# Patient Record
Sex: FEMALE | Race: WHITE | NOT HISPANIC OR LATINO | Employment: UNEMPLOYED | URBAN - METROPOLITAN AREA
[De-identification: names, ages, dates, MRNs, and addresses within clinical notes are randomized per-mention and may not be internally consistent; named-entity substitution may affect disease eponyms.]

---

## 2017-11-06 ENCOUNTER — HOSPITAL ENCOUNTER (EMERGENCY)
Facility: MEDICAL CENTER | Age: 57
End: 2017-11-06

## 2017-11-06 VITALS
BODY MASS INDEX: 28.16 KG/M2 | OXYGEN SATURATION: 98 % | SYSTOLIC BLOOD PRESSURE: 160 MMHG | DIASTOLIC BLOOD PRESSURE: 90 MMHG | HEIGHT: 62 IN | WEIGHT: 153 LBS | HEART RATE: 74 BPM | TEMPERATURE: 97.8 F | RESPIRATION RATE: 18 BRPM

## 2017-11-06 PROCEDURE — 302449 STATCHG TRIAGE ONLY (STATISTIC)

## 2017-11-06 ASSESSMENT — PAIN SCALES - GENERAL: PAINLEVEL_OUTOF10: 8
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
## 2017-12-16 ENCOUNTER — HOSPITAL ENCOUNTER (EMERGENCY)
Facility: MEDICAL CENTER | Age: 57
End: 2017-12-16
Attending: EMERGENCY MEDICINE
Payer: MEDICAID

## 2017-12-16 VITALS
RESPIRATION RATE: 18 BRPM | BODY MASS INDEX: 28.52 KG/M2 | DIASTOLIC BLOOD PRESSURE: 80 MMHG | HEIGHT: 62 IN | TEMPERATURE: 97.7 F | SYSTOLIC BLOOD PRESSURE: 124 MMHG | WEIGHT: 155 LBS | HEART RATE: 76 BPM | OXYGEN SATURATION: 99 %

## 2017-12-16 DIAGNOSIS — T23.239A: ICD-10-CM

## 2017-12-16 DIAGNOSIS — T23.209A: ICD-10-CM

## 2017-12-16 PROCEDURE — 99284 EMERGENCY DEPT VISIT MOD MDM: CPT

## 2017-12-16 PROCEDURE — A9270 NON-COVERED ITEM OR SERVICE: HCPCS | Performed by: EMERGENCY MEDICINE

## 2017-12-16 PROCEDURE — 700102 HCHG RX REV CODE 250 W/ 637 OVERRIDE(OP): Performed by: EMERGENCY MEDICINE

## 2017-12-16 RX ORDER — OXYCODONE HYDROCHLORIDE AND ACETAMINOPHEN 5; 325 MG/1; MG/1
1-2 TABLET ORAL EVERY 4 HOURS PRN
Qty: 15 TAB | Refills: 0 | Status: SHIPPED | OUTPATIENT
Start: 2017-12-16 | End: 2017-12-19

## 2017-12-16 RX ADMIN — SILVER SULFADIAZINE 1 G: 10 CREAM TOPICAL at 04:11

## 2017-12-16 NOTE — DISCHARGE INSTRUCTIONS
Burn Care  Your skin is a natural barrier to infection. It is the largest organ of your body. Burns damage this natural protection. To help prevent infection, it is very important to follow your caregiver's instructions in the care of your burn.  Burns are classified as:  · First degree. There is only redness of the skin (erythema). No scarring is expected.  · Second degree. There is blistering of the skin. Scarring may occur with deeper burns.  · Third degree. All layers of the skin are injured, and scarring is expected.  HOME CARE INSTRUCTIONS   · Wash your hands well before changing your bandage.  · Change your bandage as often as directed by your caregiver.  ¨ Remove the old bandage. If the bandage sticks, you may soak it off with cool, clean water.  ¨ Cleanse the burn thoroughly but gently with mild soap and water.  ¨ Pat the area dry with a clean, dry cloth.  ¨ Apply a thin layer of antibacterial cream to the burn.  ¨ Apply a clean bandage as instructed by your caregiver.  ¨ Keep the bandage as clean and dry as possible.  · Elevate the affected area for the first 24 hours, then as instructed by your caregiver.  · Only take over-the-counter or prescription medicines for pain, discomfort, or fever as directed by your caregiver.  SEEK IMMEDIATE MEDICAL CARE IF:   · You develop excessive pain.  · You develop redness, tenderness, swelling, or red streaks near the burn.  · The burned area develops yellowish-white fluid (pus) or a bad smell.  · You have a fever.  MAKE SURE YOU:   · Understand these instructions.  · Will watch your condition.  · Will get help right away if you are not doing well or get worse.     This information is not intended to replace advice given to you by your health care provider. Make sure you discuss any questions you have with your health care provider.     Document Released: 12/18/2006 Document Revised: 03/11/2013 Document Reviewed: 05/09/2012  [employer] Interactive Patient Education ©2016  [employer]
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
## 2017-12-16 NOTE — ED NOTES
D/C home with written and verbal instructions re: Rx, activity, f/u.  Verbalizes understanding.  Ambulated out

## 2018-09-28 ENCOUNTER — APPOINTMENT (OUTPATIENT)
Dept: RADIOLOGY | Facility: MEDICAL CENTER | Age: 58
End: 2018-09-28
Attending: EMERGENCY MEDICINE
Payer: MEDICAID

## 2018-09-28 ENCOUNTER — HOSPITAL ENCOUNTER (EMERGENCY)
Facility: MEDICAL CENTER | Age: 58
End: 2018-09-28
Attending: EMERGENCY MEDICINE
Payer: MEDICAID

## 2018-09-28 VITALS
WEIGHT: 152.78 LBS | BODY MASS INDEX: 28.11 KG/M2 | HEIGHT: 62 IN | SYSTOLIC BLOOD PRESSURE: 147 MMHG | OXYGEN SATURATION: 98 % | DIASTOLIC BLOOD PRESSURE: 91 MMHG | TEMPERATURE: 98.2 F | HEART RATE: 75 BPM | RESPIRATION RATE: 16 BRPM

## 2018-09-28 DIAGNOSIS — S63.501A WRIST SPRAIN, RIGHT, INITIAL ENCOUNTER: ICD-10-CM

## 2018-09-28 DIAGNOSIS — S63.91XA HAND SPRAIN, RIGHT, INITIAL ENCOUNTER: ICD-10-CM

## 2018-09-28 PROCEDURE — 73130 X-RAY EXAM OF HAND: CPT | Mod: RT

## 2018-09-28 PROCEDURE — 99284 EMERGENCY DEPT VISIT MOD MDM: CPT

## 2018-09-28 PROCEDURE — 29125 APPL SHORT ARM SPLINT STATIC: CPT

## 2018-09-28 ASSESSMENT — ENCOUNTER SYMPTOMS
FALLS: 1
HEADACHES: 0

## 2018-09-28 ASSESSMENT — PAIN SCALES - GENERAL: PAINLEVEL_OUTOF10: 7
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
## 2018-09-28 NOTE — ED NOTES
Pt discharged home. Assessment complete. Pt ambulates self. VS stable. Pt verbalized understanding discharge instructions. Splint applied.
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
## 2018-09-28 NOTE — ED NOTES
Assumed care of patient. Pt assessed, AAO x 4 . Patient's concerns addressed.  Fall precautions in place.  Pt repositioned and comfortable.  Call light within reach. Will continue to monitor.

## 2018-09-28 NOTE — ED NOTES
KINDER FALL RISK       RISK  Present to ED b/c of fall (syncope, seizure, or ALOC) yes  Age  >70 no  Altered Mental Status (Intoxicated with alcohol or substance confusion, inability to follow instructions, disorientation) no  Impaired Mobility (Ambulates or transfers with assistive devices or assist. Ambulates with unsteady gait and no assistance.  Unable to ambulate to transfer.) no  Nursing Judgement (Bowel or bladder incontinence, diarrhea, urinary frequency or urgency, leg weakness, orthostatic hypotension, dizziness or vertigo, narcotic use.) no    YES TO ANY RISK = HIGH FALL RISK     Interventions in place marked in RED   1. Move patient closer to nurses stations  2. Familiarize the patient with environment  3. Place call light within reach and demonstrate call light use  4. Keep patients personal possessions within patient safe reach (if appropriate)   5. Place stretcher in low position and brakes locked  6.Place yellow socks and armband on patient   7. Place green triangle on patients door  8. Give patient urinal if applicable  9. Keep floor surfaces clean and dry  10.Keep patient care areas uncluttered  11. Use a lap belt or posey vest   12. Assess patient hourly for :Pain, persona needs, position change, and call light access.

## 2021-10-16 ENCOUNTER — APPOINTMENT (OUTPATIENT)
Dept: RADIOLOGY | Facility: MEDICAL CENTER | Age: 61
End: 2021-10-16
Attending: EMERGENCY MEDICINE
Payer: MEDICAID

## 2021-10-16 ENCOUNTER — HOSPITAL ENCOUNTER (EMERGENCY)
Facility: MEDICAL CENTER | Age: 61
End: 2021-10-16
Attending: EMERGENCY MEDICINE
Payer: MEDICAID

## 2021-10-16 VITALS
TEMPERATURE: 98.1 F | HEART RATE: 68 BPM | HEIGHT: 62 IN | SYSTOLIC BLOOD PRESSURE: 184 MMHG | WEIGHT: 155 LBS | OXYGEN SATURATION: 93 % | DIASTOLIC BLOOD PRESSURE: 105 MMHG | RESPIRATION RATE: 27 BRPM | BODY MASS INDEX: 28.52 KG/M2

## 2021-10-16 DIAGNOSIS — I10 HTN (HYPERTENSION): ICD-10-CM

## 2021-10-16 DIAGNOSIS — I15.9 SECONDARY HYPERTENSION: ICD-10-CM

## 2021-10-16 DIAGNOSIS — J40 BRONCHITIS: ICD-10-CM

## 2021-10-16 DIAGNOSIS — B34.9 ACUTE VIRAL SYNDROME: ICD-10-CM

## 2021-10-16 LAB
ALBUMIN SERPL BCP-MCNC: 4.1 G/DL (ref 3.2–4.9)
ALBUMIN/GLOB SERPL: 1.1 G/DL
ALP SERPL-CCNC: 78 U/L (ref 30–99)
ALT SERPL-CCNC: 14 U/L (ref 2–50)
ANION GAP SERPL CALC-SCNC: 13 MMOL/L (ref 7–16)
AST SERPL-CCNC: 23 U/L (ref 12–45)
BASOPHILS # BLD AUTO: 0.6 % (ref 0–1.8)
BASOPHILS # BLD: 0.03 K/UL (ref 0–0.12)
BILIRUB SERPL-MCNC: 0.5 MG/DL (ref 0.1–1.5)
BUN SERPL-MCNC: 11 MG/DL (ref 8–22)
CALCIUM SERPL-MCNC: 9.1 MG/DL (ref 8.5–10.5)
CHLORIDE SERPL-SCNC: 105 MMOL/L (ref 96–112)
CO2 SERPL-SCNC: 21 MMOL/L (ref 20–33)
CREAT SERPL-MCNC: 0.57 MG/DL (ref 0.5–1.4)
EOSINOPHIL # BLD AUTO: 0.09 K/UL (ref 0–0.51)
EOSINOPHIL NFR BLD: 1.8 % (ref 0–6.9)
ERYTHROCYTE [DISTWIDTH] IN BLOOD BY AUTOMATED COUNT: 44.6 FL (ref 35.9–50)
GLOBULIN SER CALC-MCNC: 3.6 G/DL (ref 1.9–3.5)
GLUCOSE SERPL-MCNC: 101 MG/DL (ref 65–99)
HCT VFR BLD AUTO: 39.5 % (ref 37–47)
HGB BLD-MCNC: 14 G/DL (ref 12–16)
IMM GRANULOCYTES # BLD AUTO: 0.02 K/UL (ref 0–0.11)
IMM GRANULOCYTES NFR BLD AUTO: 0.4 % (ref 0–0.9)
LYMPHOCYTES # BLD AUTO: 1.44 K/UL (ref 1–4.8)
LYMPHOCYTES NFR BLD: 28.3 % (ref 22–41)
MCH RBC QN AUTO: 33.2 PG (ref 27–33)
MCHC RBC AUTO-ENTMCNC: 35.4 G/DL (ref 33.6–35)
MCV RBC AUTO: 93.6 FL (ref 81.4–97.8)
MONOCYTES # BLD AUTO: 0.55 K/UL (ref 0–0.85)
MONOCYTES NFR BLD AUTO: 10.8 % (ref 0–13.4)
NEUTROPHILS # BLD AUTO: 2.96 K/UL (ref 2–7.15)
NEUTROPHILS NFR BLD: 58.1 % (ref 44–72)
NRBC # BLD AUTO: 0 K/UL
NRBC BLD-RTO: 0 /100 WBC
PLATELET # BLD AUTO: 163 K/UL (ref 164–446)
PMV BLD AUTO: 11.1 FL (ref 9–12.9)
POTASSIUM SERPL-SCNC: 3.7 MMOL/L (ref 3.6–5.5)
PROT SERPL-MCNC: 7.7 G/DL (ref 6–8.2)
RBC # BLD AUTO: 4.22 M/UL (ref 4.2–5.4)
SODIUM SERPL-SCNC: 139 MMOL/L (ref 135–145)
TROPONIN T SERPL-MCNC: <6 NG/L (ref 6–19)
WBC # BLD AUTO: 5.1 K/UL (ref 4.8–10.8)

## 2021-10-16 PROCEDURE — 700102 HCHG RX REV CODE 250 W/ 637 OVERRIDE(OP): Performed by: EMERGENCY MEDICINE

## 2021-10-16 PROCEDURE — 700111 HCHG RX REV CODE 636 W/ 250 OVERRIDE (IP): Performed by: EMERGENCY MEDICINE

## 2021-10-16 PROCEDURE — 85025 COMPLETE CBC W/AUTO DIFF WBC: CPT

## 2021-10-16 PROCEDURE — A9270 NON-COVERED ITEM OR SERVICE: HCPCS | Performed by: EMERGENCY MEDICINE

## 2021-10-16 PROCEDURE — 80053 COMPREHEN METABOLIC PANEL: CPT

## 2021-10-16 PROCEDURE — 96374 THER/PROPH/DIAG INJ IV PUSH: CPT

## 2021-10-16 PROCEDURE — 99284 EMERGENCY DEPT VISIT MOD MDM: CPT

## 2021-10-16 PROCEDURE — 71045 X-RAY EXAM CHEST 1 VIEW: CPT

## 2021-10-16 PROCEDURE — U0005 INFEC AGEN DETEC AMPLI PROBE: HCPCS

## 2021-10-16 PROCEDURE — 84484 ASSAY OF TROPONIN QUANT: CPT

## 2021-10-16 PROCEDURE — U0003 INFECTIOUS AGENT DETECTION BY NUCLEIC ACID (DNA OR RNA); SEVERE ACUTE RESPIRATORY SYNDROME CORONAVIRUS 2 (SARS-COV-2) (CORONAVIRUS DISEASE [COVID-19]), AMPLIFIED PROBE TECHNIQUE, MAKING USE OF HIGH THROUGHPUT TECHNOLOGIES AS DESCRIBED BY CMS-2020-01-R: HCPCS

## 2021-10-16 RX ORDER — LISINOPRIL 20 MG/1
20 TABLET ORAL DAILY
Qty: 30 TABLET | Refills: 4 | Status: SHIPPED | OUTPATIENT
Start: 2021-10-16

## 2021-10-16 RX ORDER — BENZONATATE 100 MG/1
200 CAPSULE ORAL 3 TIMES DAILY PRN
Qty: 20 CAPSULE | Refills: 0 | Status: SHIPPED | OUTPATIENT
Start: 2021-10-16

## 2021-10-16 RX ORDER — LISINOPRIL 20 MG/1
20 TABLET ORAL ONCE
Status: COMPLETED | OUTPATIENT
Start: 2021-10-16 | End: 2021-10-16

## 2021-10-16 RX ORDER — DEXAMETHASONE SODIUM PHOSPHATE 4 MG/ML
4 INJECTION, SOLUTION INTRA-ARTICULAR; INTRALESIONAL; INTRAMUSCULAR; INTRAVENOUS; SOFT TISSUE ONCE
Status: COMPLETED | OUTPATIENT
Start: 2021-10-16 | End: 2021-10-16

## 2021-10-16 RX ADMIN — LISINOPRIL 20 MG: 20 TABLET ORAL at 17:40

## 2021-10-16 RX ADMIN — DEXAMETHASONE SODIUM PHOSPHATE 4 MG: 4 INJECTION, SOLUTION INTRA-ARTICULAR; INTRALESIONAL; INTRAMUSCULAR; INTRAVENOUS; SOFT TISSUE at 17:40

## 2021-10-16 NOTE — ED TRIAGE NOTES
"Pt amb to triage.  Chief Complaint   Patient presents with   • Headache   • Cough     Pt reports symptoms x1wk. Smoker.  Pt hypertensive in triage, reports she was prescribed meds awhile ago but doesn't take anything. \"I haven't seen my doctor.\"  "

## 2021-10-17 LAB
SARS-COV-2 RNA RESP QL NAA+PROBE: NOTDETECTED
SPECIMEN SOURCE: NORMAL

## 2021-10-17 NOTE — ED PROVIDER NOTES
ED Provider Note    CHIEF COMPLAINT  Chief Complaint   Patient presents with   • Headache   • Cough   • Hypertension       HPI  Meggan Castro is a 61 y.o. female who presents for evaluation of a cough and headache for the past week.  A frequent dry cough.  She denies fever.  She states she does have some body aches associated with headache.  The patient reports she is a smoker but denies any diagnosis of COPD.  She states that she frequently gets bronchitis.  She currently is sleeping in her car at nighttime and thinks that the cold weather could have something to do with this.  No vomiting.  No abdominal pain.  No real chest or back pain.  She has received 1 dose of the Covid vaccine.  No other acute complaints offered at this time.  She states that she has not been on her blood pressure medication in years.    REVIEW OF SYSTEMS  Negative for fever, rash, abdominal pain, nausea, vomiting, diarrhea, focal weakness, focal numbness, focal tingling, back pain. All other systems are negative.     PAST MEDICAL HISTORY  Past Medical History:   Diagnosis Date   • HTN (hypertension)    • Psychiatric problem        FAMILY HISTORY  Family History   Problem Relation Age of Onset   • Cancer Mother    • Heart Disease Father    • Stroke Father    • Diabetes Maternal Grandmother        SOCIAL HISTORY  Social History     Tobacco Use   • Smoking status: Current Every Day Smoker     Packs/day: 0.25     Years: 10.00     Pack years: 2.50     Types: Cigarettes   • Smokeless tobacco: Never Used   Substance Use Topics   • Alcohol use: Yes   • Drug use: Yes     Types: Inhaled     Comment: METH       SURGICAL HISTORY  Past Surgical History:   Procedure Laterality Date   • ANKLE ORIF  2012    Performed by Tan Douglas M.D. at [facility] ORS   • PB ANESTH, DELIVERY ONLY  1990   • GYN SURGERY     • PRIMARY C SECTION         CURRENT MEDICATIONS  I personally reviewed the medication list in the charting  "documentation.     ALLERGIES  Allergies   Allergen Reactions   • Tape Rash     Paper tape reaction,        MEDICAL RECORD  I have reviewed patient's medical record and pertinent results are listed above.      PHYSICAL EXAM  VITAL SIGNS: BP (!) 184/105   Pulse 68   Temp 36.7 °C (98.1 °F) (Temporal)   Resp (!) 27   Ht 1.575 m (5' 2\")   Wt 70.3 kg (155 lb)   LMP 11/29/2012   SpO2 93%   BMI 28.35 kg/m²    Constitutional: Alert in no apparent distress.  Frequently coughing during the exam.  Does not appear toxically ill.  HENT: No signs of trauma.   Eyes: Conjunctiva normal, Non-icteric.   Chest: Normal nonlabored respirations when she is not coughing.  Skin: Warm, Dry, No erythema, No rash.   Extremities: No edema.   Musculoskeletal: Good range of motion in all major joints.  Neurologic: Alert, No focal deficits noted.  Psychiatric: Affect normal, Judgment normal.    This patient was cared for during the COVID-19 pandemic.  History and physical exam may be limited/truncated by the inherent challenges of the virus and the need to decrease personal and staff exposure to novel coronavirus.  Some aspects of disease management may be different to protect staff and help slow the spread of disease. I verified that, if possible, the patient was wearing a mask and I was wearing a mask every time I encountered the patient.     DIAGNOSTIC STUDIES / PROCEDURES    LABS/EKGs  Results for orders placed or performed during the hospital encounter of 10/16/21   CBC WITH DIFFERENTIAL   Result Value Ref Range    WBC 5.1 4.8 - 10.8 K/uL    RBC 4.22 4.20 - 5.40 M/uL    Hemoglobin 14.0 12.0 - 16.0 g/dL    Hematocrit 39.5 37.0 - 47.0 %    MCV 93.6 81.4 - 97.8 fL    MCH 33.2 (H) 27.0 - 33.0 pg    MCHC 35.4 (H) 33.6 - 35.0 g/dL    RDW 44.6 35.9 - 50.0 fL    Platelet Count 163 (L) 164 - 446 K/uL    MPV 11.1 9.0 - 12.9 fL    Neutrophils-Polys 58.10 44.00 - 72.00 %    Lymphocytes 28.30 22.00 - 41.00 %    Monocytes 10.80 0.00 - 13.40 %    " Eosinophils 1.80 0.00 - 6.90 %    Basophils 0.60 0.00 - 1.80 %    Immature Granulocytes 0.40 0.00 - 0.90 %    Nucleated RBC 0.00 /100 WBC    Neutrophils (Absolute) 2.96 2.00 - 7.15 K/uL    Lymphs (Absolute) 1.44 1.00 - 4.80 K/uL    Monos (Absolute) 0.55 0.00 - 0.85 K/uL    Eos (Absolute) 0.09 0.00 - 0.51 K/uL    Baso (Absolute) 0.03 0.00 - 0.12 K/uL    Immature Granulocytes (abs) 0.02 0.00 - 0.11 K/uL    NRBC (Absolute) 0.00 K/uL   COMP METABOLIC PANEL   Result Value Ref Range    Sodium 139 135 - 145 mmol/L    Potassium 3.7 3.6 - 5.5 mmol/L    Chloride 105 96 - 112 mmol/L    Co2 21 20 - 33 mmol/L    Anion Gap 13.0 7.0 - 16.0    Glucose 101 (H) 65 - 99 mg/dL    Bun 11 8 - 22 mg/dL    Creatinine 0.57 0.50 - 1.40 mg/dL    Calcium 9.1 8.5 - 10.5 mg/dL    AST(SGOT) 23 12 - 45 U/L    ALT(SGPT) 14 2 - 50 U/L    Alkaline Phosphatase 78 30 - 99 U/L    Total Bilirubin 0.5 0.1 - 1.5 mg/dL    Albumin 4.1 3.2 - 4.9 g/dL    Total Protein 7.7 6.0 - 8.2 g/dL    Globulin 3.6 (H) 1.9 - 3.5 g/dL    A-G Ratio 1.1 g/dL   TROPONIN   Result Value Ref Range    Troponin T <6 6 - 19 ng/L   ESTIMATED GFR   Result Value Ref Range    GFR If African American >60 >60 mL/min/1.73 m 2    GFR If Non African American >60 >60 mL/min/1.73 m 2        RADIOLOGY  DX-CHEST-PORTABLE (1 VIEW)   Final Result         1. No acute cardiopulmonary abnormalities are identified.            COURSE & MEDICAL DECISION MAKING  I have reviewed any medical record information, laboratory studies and radiographic results as noted above.  Differential diagnoses includes: Pneumonia, bronchitis, anemia, dehydration, electrolyte abnormalities, COVID-19    Encounter Summary: This is a very pleasant 61 y.o. female who unfortunately required evaluation in the emergency department today with a cough headache and body aches for the past week or so.  She presents hypertensive but has not been on her blood pressure medication for years.  She does not have chest pain.  The patient  reports the cough is nonproductive.  She only received one of her 2 Covid vaccines.  No fever.  On exam she is not hypoxic, she is not tachycardic, she appears well other than coughing frequently.  Chest x-ray is clear.  Her blood work is unremarkable.  We will treat her with a dose of Decadron here in the emergency department, will prescribe her Tessalon and her regular lisinopril 20 mg daily.  She will be swabbed for Covid but these results will not come back today she will follow-up with them tomorrow.  Patient is stable and appropriate to be discharged, she will follow up with an outpatient provider.  Strict return instructions provided      DISPOSITION: Discharged home in stable condition      FINAL IMPRESSION  1. Bronchitis    2. Secondary hypertension    3. Acute viral syndrome           This dictation was created using voice recognition software. The accuracy of the dictation is limited to the abilities of the software. I expect there may be some errors of grammar and possibly content. The nursing notes were reviewed and certain aspects of this information were incorporated into this note.    Electronically signed by: Jay Esparza M.D., 10/16/2021 5:32 PM

## 2021-10-17 NOTE — ED NOTES
Pt ambulatory to room from waiting area. Pt c/o cough and headache for at least a week. Pt got her first dose of the Pfizer Covid vaccine on 9/28. Denies any sick contacts. Pt placed on cardiac monitor, pulse ox, and automatic BP cuff.

## 2022-02-23 PROBLEM — T84.84XA PAINFUL ORTHOPAEDIC HARDWARE (HCC): Status: ACTIVE | Noted: 2022-02-23

## 2022-03-15 ENCOUNTER — PRE-ADMISSION TESTING (OUTPATIENT)
Dept: ADMISSIONS | Facility: MEDICAL CENTER | Age: 62
End: 2022-03-15
Attending: ORTHOPAEDIC SURGERY
Payer: MEDICAID

## 2022-03-15 NOTE — OR NURSING
Pre-admit phone appt. completed. Patient states she thinks she will be placed in walking boot post op. States she had no specific non-weight bearing instructions from her MD.

## 2022-03-17 ENCOUNTER — PHARMACY VISIT (OUTPATIENT)
Dept: PHARMACY | Facility: MEDICAL CENTER | Age: 62
End: 2022-03-17
Payer: COMMERCIAL

## 2022-03-17 ENCOUNTER — APPOINTMENT (OUTPATIENT)
Dept: RADIOLOGY | Facility: MEDICAL CENTER | Age: 62
End: 2022-03-17
Attending: ORTHOPAEDIC SURGERY
Payer: MEDICAID

## 2022-03-17 ENCOUNTER — ANESTHESIA EVENT (OUTPATIENT)
Dept: SURGERY | Facility: MEDICAL CENTER | Age: 62
End: 2022-03-17
Payer: MEDICAID

## 2022-03-17 ENCOUNTER — HOSPITAL ENCOUNTER (OUTPATIENT)
Facility: MEDICAL CENTER | Age: 62
End: 2022-03-17
Attending: ORTHOPAEDIC SURGERY | Admitting: PHYSICIAN ASSISTANT
Payer: MEDICAID

## 2022-03-17 ENCOUNTER — ANESTHESIA (OUTPATIENT)
Dept: SURGERY | Facility: MEDICAL CENTER | Age: 62
End: 2022-03-17
Payer: MEDICAID

## 2022-03-17 VITALS
OXYGEN SATURATION: 92 % | WEIGHT: 166.23 LBS | HEART RATE: 68 BPM | BODY MASS INDEX: 30.59 KG/M2 | HEIGHT: 62 IN | RESPIRATION RATE: 16 BRPM | TEMPERATURE: 97.2 F | DIASTOLIC BLOOD PRESSURE: 77 MMHG | SYSTOLIC BLOOD PRESSURE: 158 MMHG

## 2022-03-17 DIAGNOSIS — T84.84XA PAINFUL ORTHOPAEDIC HARDWARE (HCC): ICD-10-CM

## 2022-03-17 LAB
ANION GAP SERPL CALC-SCNC: 13 MMOL/L (ref 7–16)
BUN SERPL-MCNC: 13 MG/DL (ref 8–22)
CALCIUM SERPL-MCNC: 9.1 MG/DL (ref 8.5–10.5)
CHLORIDE SERPL-SCNC: 107 MMOL/L (ref 96–112)
CO2 SERPL-SCNC: 22 MMOL/L (ref 20–33)
CREAT SERPL-MCNC: 0.65 MG/DL (ref 0.5–1.4)
EKG IMPRESSION: NORMAL
EXTERNAL QUALITY CONTROL: NORMAL
GFR SERPLBLD CREATININE-BSD FMLA CKD-EPI: 101 ML/MIN/1.73 M 2
GLUCOSE SERPL-MCNC: 92 MG/DL (ref 65–99)
POTASSIUM SERPL-SCNC: 4.1 MMOL/L (ref 3.6–5.5)
SARS-COV+SARS-COV-2 AG RESP QL IA.RAPID: NEGATIVE
SODIUM SERPL-SCNC: 142 MMOL/L (ref 135–145)

## 2022-03-17 PROCEDURE — 501838 HCHG SUTURE GENERAL: Performed by: ORTHOPAEDIC SURGERY

## 2022-03-17 PROCEDURE — 36415 COLL VENOUS BLD VENIPUNCTURE: CPT

## 2022-03-17 PROCEDURE — A9270 NON-COVERED ITEM OR SERVICE: HCPCS | Performed by: ANESTHESIOLOGY

## 2022-03-17 PROCEDURE — 160035 HCHG PACU - 1ST 60 MINS PHASE I: Performed by: ORTHOPAEDIC SURGERY

## 2022-03-17 PROCEDURE — 20680 REMOVAL OF IMPLANT DEEP: CPT | Mod: RT | Performed by: ORTHOPAEDIC SURGERY

## 2022-03-17 PROCEDURE — 700102 HCHG RX REV CODE 250 W/ 637 OVERRIDE(OP): Performed by: ANESTHESIOLOGY

## 2022-03-17 PROCEDURE — 700111 HCHG RX REV CODE 636 W/ 250 OVERRIDE (IP): Performed by: PHYSICIAN ASSISTANT

## 2022-03-17 PROCEDURE — 20680 REMOVAL OF IMPLANT DEEP: CPT | Mod: 80ROC,RT | Performed by: STUDENT IN AN ORGANIZED HEALTH CARE EDUCATION/TRAINING PROGRAM

## 2022-03-17 PROCEDURE — RXMED WILLOW AMBULATORY MEDICATION CHARGE: Performed by: ORTHOPAEDIC SURGERY

## 2022-03-17 PROCEDURE — 160025 RECOVERY II MINUTES (STATS): Performed by: ORTHOPAEDIC SURGERY

## 2022-03-17 PROCEDURE — 80048 BASIC METABOLIC PNL TOTAL CA: CPT

## 2022-03-17 PROCEDURE — 700101 HCHG RX REV CODE 250: Performed by: ANESTHESIOLOGY

## 2022-03-17 PROCEDURE — 160039 HCHG SURGERY MINUTES - EA ADDL 1 MIN LEVEL 3: Performed by: ORTHOPAEDIC SURGERY

## 2022-03-17 PROCEDURE — 160028 HCHG SURGERY MINUTES - 1ST 30 MINS LEVEL 3: Performed by: ORTHOPAEDIC SURGERY

## 2022-03-17 PROCEDURE — 160048 HCHG OR STATISTICAL LEVEL 1-5: Performed by: ORTHOPAEDIC SURGERY

## 2022-03-17 PROCEDURE — 700101 HCHG RX REV CODE 250: Performed by: ORTHOPAEDIC SURGERY

## 2022-03-17 PROCEDURE — 93010 ELECTROCARDIOGRAM REPORT: CPT | Performed by: INTERNAL MEDICINE

## 2022-03-17 PROCEDURE — 160046 HCHG PACU - 1ST 60 MINS PHASE II: Performed by: ORTHOPAEDIC SURGERY

## 2022-03-17 PROCEDURE — 160002 HCHG RECOVERY MINUTES (STAT): Performed by: ORTHOPAEDIC SURGERY

## 2022-03-17 PROCEDURE — 93005 ELECTROCARDIOGRAM TRACING: CPT | Performed by: ORTHOPAEDIC SURGERY

## 2022-03-17 PROCEDURE — 700111 HCHG RX REV CODE 636 W/ 250 OVERRIDE (IP): Performed by: ANESTHESIOLOGY

## 2022-03-17 PROCEDURE — 700105 HCHG RX REV CODE 258: Performed by: ORTHOPAEDIC SURGERY

## 2022-03-17 PROCEDURE — 160009 HCHG ANES TIME/MIN: Performed by: ORTHOPAEDIC SURGERY

## 2022-03-17 PROCEDURE — 87426 SARSCOV CORONAVIRUS AG IA: CPT | Performed by: ORTHOPAEDIC SURGERY

## 2022-03-17 RX ORDER — HYDROMORPHONE HYDROCHLORIDE 1 MG/ML
0.1 INJECTION, SOLUTION INTRAMUSCULAR; INTRAVENOUS; SUBCUTANEOUS
Status: DISCONTINUED | OUTPATIENT
Start: 2022-03-17 | End: 2022-03-17 | Stop reason: HOSPADM

## 2022-03-17 RX ORDER — HALOPERIDOL 5 MG/ML
1 INJECTION INTRAMUSCULAR
Status: DISCONTINUED | OUTPATIENT
Start: 2022-03-17 | End: 2022-03-17 | Stop reason: HOSPADM

## 2022-03-17 RX ORDER — SODIUM CHLORIDE, SODIUM LACTATE, POTASSIUM CHLORIDE, CALCIUM CHLORIDE 600; 310; 30; 20 MG/100ML; MG/100ML; MG/100ML; MG/100ML
INJECTION, SOLUTION INTRAVENOUS CONTINUOUS
Status: DISCONTINUED | OUTPATIENT
Start: 2022-03-17 | End: 2022-03-17 | Stop reason: HOSPADM

## 2022-03-17 RX ORDER — BUPIVACAINE HYDROCHLORIDE AND EPINEPHRINE 5; 5 MG/ML; UG/ML
INJECTION, SOLUTION EPIDURAL; INTRACAUDAL; PERINEURAL
Status: DISCONTINUED | OUTPATIENT
Start: 2022-03-17 | End: 2022-03-17 | Stop reason: HOSPADM

## 2022-03-17 RX ORDER — HYDROMORPHONE HYDROCHLORIDE 1 MG/ML
0.2 INJECTION, SOLUTION INTRAMUSCULAR; INTRAVENOUS; SUBCUTANEOUS
Status: DISCONTINUED | OUTPATIENT
Start: 2022-03-17 | End: 2022-03-17 | Stop reason: HOSPADM

## 2022-03-17 RX ORDER — ONDANSETRON 2 MG/ML
INJECTION INTRAMUSCULAR; INTRAVENOUS PRN
Status: DISCONTINUED | OUTPATIENT
Start: 2022-03-17 | End: 2022-03-17 | Stop reason: SURG

## 2022-03-17 RX ORDER — OXYCODONE HYDROCHLORIDE 5 MG/1
5 TABLET ORAL EVERY 4 HOURS PRN
Qty: 20 TABLET | Refills: 0 | Status: SHIPPED | OUTPATIENT
Start: 2022-03-17 | End: 2022-03-22

## 2022-03-17 RX ORDER — HYDROMORPHONE HYDROCHLORIDE 1 MG/ML
0.4 INJECTION, SOLUTION INTRAMUSCULAR; INTRAVENOUS; SUBCUTANEOUS
Status: DISCONTINUED | OUTPATIENT
Start: 2022-03-17 | End: 2022-03-17 | Stop reason: HOSPADM

## 2022-03-17 RX ORDER — ONDANSETRON 2 MG/ML
4 INJECTION INTRAMUSCULAR; INTRAVENOUS
Status: DISCONTINUED | OUTPATIENT
Start: 2022-03-17 | End: 2022-03-17 | Stop reason: HOSPADM

## 2022-03-17 RX ORDER — LIDOCAINE HYDROCHLORIDE 20 MG/ML
INJECTION, SOLUTION EPIDURAL; INFILTRATION; INTRACAUDAL; PERINEURAL PRN
Status: DISCONTINUED | OUTPATIENT
Start: 2022-03-17 | End: 2022-03-17 | Stop reason: SURG

## 2022-03-17 RX ORDER — OXYCODONE HCL 5 MG/5 ML
5 SOLUTION, ORAL ORAL
Status: COMPLETED | OUTPATIENT
Start: 2022-03-17 | End: 2022-03-17

## 2022-03-17 RX ORDER — OXYCODONE HCL 5 MG/5 ML
10 SOLUTION, ORAL ORAL
Status: COMPLETED | OUTPATIENT
Start: 2022-03-17 | End: 2022-03-17

## 2022-03-17 RX ORDER — PROPOFOL 10 MG/ML
INJECTION, EMULSION INTRAVENOUS PRN
Status: DISCONTINUED | OUTPATIENT
Start: 2022-03-17 | End: 2022-03-17 | Stop reason: SURG

## 2022-03-17 RX ORDER — CEFAZOLIN SODIUM 1 G/3ML
2 INJECTION, POWDER, FOR SOLUTION INTRAMUSCULAR; INTRAVENOUS ONCE
Status: COMPLETED | OUTPATIENT
Start: 2022-03-17 | End: 2022-03-17

## 2022-03-17 RX ORDER — MIDAZOLAM HYDROCHLORIDE 1 MG/ML
INJECTION INTRAMUSCULAR; INTRAVENOUS PRN
Status: DISCONTINUED | OUTPATIENT
Start: 2022-03-17 | End: 2022-03-17 | Stop reason: SURG

## 2022-03-17 RX ORDER — DIPHENHYDRAMINE HYDROCHLORIDE 50 MG/ML
12.5 INJECTION INTRAMUSCULAR; INTRAVENOUS
Status: DISCONTINUED | OUTPATIENT
Start: 2022-03-17 | End: 2022-03-17 | Stop reason: HOSPADM

## 2022-03-17 RX ORDER — DEXAMETHASONE SODIUM PHOSPHATE 4 MG/ML
INJECTION, SOLUTION INTRA-ARTICULAR; INTRALESIONAL; INTRAMUSCULAR; INTRAVENOUS; SOFT TISSUE PRN
Status: DISCONTINUED | OUTPATIENT
Start: 2022-03-17 | End: 2022-03-17 | Stop reason: SURG

## 2022-03-17 RX ORDER — ACETAMINOPHEN 500 MG
1000 TABLET ORAL ONCE
Status: COMPLETED | OUTPATIENT
Start: 2022-03-17 | End: 2022-03-17

## 2022-03-17 RX ADMIN — FENTANYL CITRATE 100 MCG: 50 INJECTION, SOLUTION INTRAMUSCULAR; INTRAVENOUS at 08:06

## 2022-03-17 RX ADMIN — MIDAZOLAM HYDROCHLORIDE 2 MG: 1 INJECTION, SOLUTION INTRAMUSCULAR; INTRAVENOUS at 08:06

## 2022-03-17 RX ADMIN — ACETAMINOPHEN 1000 MG: 500 TABLET ORAL at 07:43

## 2022-03-17 RX ADMIN — CEFAZOLIN 2 G: 330 INJECTION, POWDER, FOR SOLUTION INTRAMUSCULAR; INTRAVENOUS at 08:00

## 2022-03-17 RX ADMIN — SODIUM CHLORIDE, POTASSIUM CHLORIDE, SODIUM LACTATE AND CALCIUM CHLORIDE: 600; 310; 30; 20 INJECTION, SOLUTION INTRAVENOUS at 07:43

## 2022-03-17 RX ADMIN — PROPOFOL 150 MG: 10 INJECTION, EMULSION INTRAVENOUS at 08:06

## 2022-03-17 RX ADMIN — FENTANYL CITRATE 25 MCG: 50 INJECTION INTRAMUSCULAR; INTRAVENOUS at 08:55

## 2022-03-17 RX ADMIN — LIDOCAINE HYDROCHLORIDE 100 MG: 20 INJECTION, SOLUTION EPIDURAL; INFILTRATION; INTRACAUDAL at 08:06

## 2022-03-17 RX ADMIN — DEXAMETHASONE SODIUM PHOSPHATE 4 MG: 4 INJECTION, SOLUTION INTRA-ARTICULAR; INTRALESIONAL; INTRAMUSCULAR; INTRAVENOUS; SOFT TISSUE at 08:06

## 2022-03-17 RX ADMIN — OXYCODONE HYDROCHLORIDE 10 MG: 5 SOLUTION ORAL at 08:54

## 2022-03-17 RX ADMIN — FENTANYL CITRATE 25 MCG: 50 INJECTION INTRAMUSCULAR; INTRAVENOUS at 09:13

## 2022-03-17 RX ADMIN — ONDANSETRON 4 MG: 2 INJECTION INTRAMUSCULAR; INTRAVENOUS at 08:06

## 2022-03-17 ASSESSMENT — PAIN DESCRIPTION - PAIN TYPE
TYPE: SURGICAL PAIN

## 2022-03-17 ASSESSMENT — FIBROSIS 4 INDEX: FIB4 SCORE: 2.22

## 2022-03-17 ASSESSMENT — PAIN SCALES - GENERAL: PAIN_LEVEL: 4

## 2022-03-17 NOTE — ANESTHESIA PROCEDURE NOTES
Airway    Date/Time: 3/17/2022 8:06 AM  Performed by: Zach Vasquez M.D.  Authorized by: Zach Vasquez M.D.     Location:  OR  Urgency:  Elective  Indications for Airway Management:  Anesthesia      Spontaneous Ventilation: absent    Sedation Level:  Deep  Preoxygenated: Yes    Final Airway Type:  Supraglottic airway  Final Supraglottic Airway:  Standard LMA    SGA Size:  4  Number of Attempts at Approach:  1

## 2022-03-17 NOTE — ANESTHESIA PREPROCEDURE EVALUATION
Case: [id number] Date/Time: 03/17/22 0745    Procedure: REMOVAL, HARDWARE - ANKLE (Right )    Diagnosis: Painful orthopaedic hardware (HCC) [T84.84XA]    Pre-op diagnosis: Painful orthopaedic hardware (HCC) [T84.84XA]    Location: [place] [address] / SURGERY [facility]    Surgeons: Tan Douglas M.D.          Relevant Problems   CARDIAC   (positive) HTN (hypertension)       Physical Exam    Airway   Mallampati: II  TM distance: >3 FB  Neck ROM: full       Cardiovascular - normal exam  Rhythm: regular  Rate: normal  (-) murmur     Dental - normal exam           Pulmonary - normal exam  Breath sounds clear to auscultation     Abdominal    Neurological - normal exam                 Anesthesia Plan    ASA 2       Plan - general       Airway plan will be LMA          Induction: intravenous    Postoperative Plan: Postoperative administration of opioids is intended.    Pertinent diagnostic labs and testing reviewed    Informed Consent:    Anesthetic plan and risks discussed with patient.    Use of blood products discussed with: patient whom consented to blood products.

## 2022-03-17 NOTE — OR NURSING
0842: Pt arrives to PACU asleep and calm. VSS. Right ankle dressing CDI and elevated on pillow.     0915: Pts friend Rhianna called. No answer.     0934: Pt states pain is tolerable and denies nausea. Dressing CDI. Masoud called to Jeffy VÁSQUEZ.

## 2022-03-17 NOTE — ANESTHESIA POSTPROCEDURE EVALUATION
Patient: Meggan Castro    Procedure Summary     Date: 03/17/22 Room / Location: [place] [address] / SURGERY [facility]    Anesthesia Start: 0800 Anesthesia Stop: 0845    Procedure: REMOVAL, HARDWARE - ANKLE (Right Ankle) Diagnosis:       Painful orthopaedic hardware (HCC)      (Painful orthopaedic hardware (HCC) [T84.84XA])    Surgeons: Tan Douglas M.D. Responsible Provider: Zach Vasquez M.D.    Anesthesia Type: general ASA Status: 2          Final Anesthesia Type: general  Last vitals  BP   Blood Pressure: 140/90    Temp   36.2 °C (97.2 °F)    Pulse   76   Resp   16    SpO2   98 %      Anesthesia Post Evaluation    Patient location during evaluation: PACU  Patient participation: complete - patient participated  Level of consciousness: awake and alert  Pain score: 4    Airway patency: patent  Anesthetic complications: no  Cardiovascular status: hemodynamically stable  Respiratory status: acceptable  Hydration status: euvolemic    PONV: none          There were no known complications for this encounter.     Nurse Pain Score: 5 (NPRS)

## 2022-03-17 NOTE — LETTER
March 9, 2022    Patient Name: Meggan Castro  Surgeon Name: Tan Douglas M.D.  Surgery Facility: [facility] ([address] [facility])  Surgery Date: 3/17/2022    The time of your surgery is not final and may change up to and until the day of your surgery. You will be contacted 24-48 hours prior to your surgery date with your check-in and surgery time.    If you will not be at one of the below numbers please call/text the surgery scheduler at [phone]  Preferred Phone: [phone]    BEFORE YOUR SURGERY  Pre Registration and/or Lab Work must be done within and no earlier than 28 days prior to your surgery date. Please call [facility] at [phone] for an appointment as soon as possible.     Please refrain from smoking any substance after midnight prior to surgery. Smoking may interfere with the anesthetic and frequently produces nausea during the recovery period.    Continue taking all lifesaving medications. Including the morning of your surgery with small sip of water.    Please read the MEDICATION INSTRUCTIONS below completely.    DAY OF YOUR SURGERY  Nothing to eat or drink after midnight     Please arrive at the hospital/surgery center at the check-in time provided.     An adult will need to bring you and take you home after your surgery.     AFTER YOUR SURGERY  Post op Appointment:   Date: 3.30.22   Time: 10:00 AM    With: Artem Dubon P.A.-C.   Location: [address] [place] [address]        TIME OFF WORK  FMLA or Disability forms can be faxed directly to: [phone] or you may drop them off at [address] [place] [id number]. Our office charges a $35.00 fee per form. Forms will be completed within 10 business days of drop off and payment received. For the status of your forms you may contact our disability office directly [phone].    MEDICATION INSTRUCTIONS  The following medications should be stopped a minimum of 10 days prior to  surgery:  All over the counter, Supplements & Herbal medications    Anorectics: Phentermine (Adipex-P, Lomaira and Suprenza), Phentermine-topiramate (Qsymia), Bupropion-naltrexone (Contrave)    Opiod Partial Agonists/Opioid Antagonists: Buprenorphine (Subocone, Belbuca, Butrans, Probuphine Implant, Sublocade), Naltrexone (ReVia, Vivitrol), Naloxone    Amphetamines: Dextroamphetamine/Amphetamine (Adderall, Mydayis), Methylphenidate Hydrochloride (Concerta, Metadate, Methylin, Ritalin)    The following medications should be stopped 5 days prior to surgery:  Blood Thinners: Any Aspirin, Aspirin products, anti-inflammatories such as ibuprofen and any blood thinners such as Coumadin and Plavix. Please consult your prescribing physician if you are on life saving blood thinners, in regards to when to stop medications prior to surgery.     The following medications should be stopped a minimum of 3 days prior to surgery:  PDE-5 inhibitors: Sildenafil (Viagra), Tadalafil (Cialis), Vardenafil (Levitra), Avanafil (Stendra)    MAO Inhibitors: Rasagiline (Azilect), Selegiline (Eldepryl, Emsam, Selapar), Isocarboxazid (Marplan), Phenelzine (Nardil)

## 2022-03-17 NOTE — H&P
3/17/2022      HPI: Meggan Castro is a 61 y.o. female who presents with complaints of pain to right ankle.  This started months ago after ORIF.  The pain is 5/10 and is described as sharp.  The pain is made worse by palpation of the area and made better by rest and immobilization.    Past Medical History:   Diagnosis Date   • Dental disorder     top denture   • Heart burn    • High cholesterol    • HTN (hypertension)    • Psychiatric problem        Past Surgical History:   Procedure Laterality Date   • ORIF, ANKLE  2012    Performed by Tan Douglas M.D. at SURGERY [facility]   • [place] ANESTH, DELIVERY ONLY  1990   • GYN SURGERY     • PRIMARY C SECTION         Medications  No current facility-administered medications on file prior to encounter.     Current Outpatient Medications on File Prior to Encounter   Medication Sig Dispense Refill   • hydrOXYzine pamoate (VISTARIL) 25 MG Cap TAKE 1 CAPSULE BY MOUTH THREE TIMES DAILY AS NEEDED FOR ITCHING FOR UP TO 30 DAYS (Patient not taking: Reported on 3/15/2022)     • benzonatate (TESSALON) 100 MG Cap Take 2 Capsules by mouth 3 times a day as needed for Cough. (Patient not taking: Reported on 3/15/2022) 20 Capsule 0   • lisinopril (PRINIVIL) 20 MG Tab Take 1 Tablet by mouth every day. 30 Tablet 4   • silver sulfADIAZINE (SILVADENE) 1 % Cream Apply to the affected area (Patient not taking: Reported on 3/15/2022) 1 Each 3   • hydrocortisone 2.5 % CREA Apply 1 Application to affected area(s) 2 times a day. Apply to affected area(s) 2 times a day. (Patient not taking: Reported on 3/15/2022) 15 g 0   • ergocalciferol (DRISDOL) 61600 UNIT capsule Take 1 Cap by mouth every Monday. (Patient not taking: Reported on 3/15/2022) 4 Cap 1   • clotrimazole (LOTRIMIN) 1 % CREA Apply Bid in between toes x 2 weeks (Patient not taking: Reported on 3/15/2022) 1 Tube 1   • ibuprofen (MOTRIN) 400 MG TABS Take 1 Tab by mouth every 8 hours as needed for Mild Pain.  "30 Tab 0       Allergies  Tape    ROS  Right ankle pin. All other systems were reviewed and found to be negative    Family History   Problem Relation Age of Onset   • Cancer Mother    • Heart Disease Father    • Stroke Father    • Diabetes Maternal Grandmother        Social History     Socioeconomic History   • Marital status: Single   Tobacco Use   • Smoking status: Current Every Day Smoker     Packs/day: 0.25     Years: 10.00     Pack years: 2.50     Types: Cigarettes   • Smokeless tobacco: Never Used   Substance and Sexual Activity   • Alcohol use: Yes     Comment: 1 per month   • Drug use: Not Currently     Types: Inhaled     Comment: hx meth   • Sexual activity: Yes     Partners: Male   Social History Narrative    ** Merged History Encounter **            Physical Exam  Vitals  /90   Pulse 76   Temp 36.2 °C (97.2 °F) (Temporal)   Resp 16   Ht 1.575 m (5' 2\")   Wt 75.4 kg (166 lb 3.6 oz)   SpO2 98%   General: Well Developed, Well Nourished, Age appropriate appearance  HEENT: Normocephalic, atraumatic  Psych: Normal mood and affect  Neck: Supple, nontender, no masses  Lungs: Breathing unlabored, No audible wheezing  Heart: Regular heart rate and rhythm  Abdomen: Soft, NT, ND  Neuro: Sensation grossly intact to BUE and BLE, moving all four extremities  Skin: Intact, no open wounds  Vascular: 2+DP/PT, Capillary refill <2 seconds  MSK: Right ankle pain over hardware      Radiographs:  No orders to display       Laboratory Values      No results for input(s): SODIUM, POTASSIUM, CHLORIDE, CO2, GLUCOSE, BUN, CPKTOTAL in the last 72 hours.          Impression:Right ankle painful hardware    Plan:We discussed the diagnosis and findings with the patient at length.  We reviewed possible non operative and operative interventions and the risks and benefits of each of these.  she had a chance to ask questions and all of these were answered to her satisfaction. The patient chose to proceed with  operative " intervention. Risks and benefits of surgery were discussed which include but are not limited to bleeding, infection, neurovascular damage, malunion, nonunion, instability, limb length discrepancy, DVT, PE, MI, Stroke and death. They understand these risks and wish to proceed.

## 2022-03-17 NOTE — ANESTHESIA TIME REPORT
Anesthesia Start and Stop Event Times     Date Time Event    3/17/2022 0715 Ready for Procedure     0800 Anesthesia Start     0845 Anesthesia Stop        Responsible Staff  03/17/22    Name Role Begin End    Zach Vasquez M.D. Anesth 0800 0845        Preop Diagnosis (Free Text):  Pre-op Diagnosis     Painful orthopaedic hardware (HCC) [T84.84XA]        Preop Diagnosis (Codes):  Diagnosis Information     Diagnosis Code(s): Painful orthopaedic hardware (HCC) [T84.84XA]        Premium Reason  Non-Premium    Comments:

## 2022-03-17 NOTE — DISCHARGE PLANNING
Meds-to-Beds: Discharge prescription orders listed below delivered to patient's bedside. RN Jeffy notified. Patient counseled. Patient elected to have co-payment billed to patient account.     Current Outpatient Medications   Medication Sig Dispense Refill   • oxyCODONE immediate-release (ROXICODONE) 5 MG Tab Take 1 Tablet by mouth every four hours as needed for up to 5 days. 20 Tablet 0      Katey Nguyen, PharmD

## 2022-03-17 NOTE — OP REPORT
DATE OF OPERATION: 3/17/2022     PREOPERATIVE DIAGNOSIS:  1. Right ankle painful hardware    POSTOPERATIVE DIAGNOSIS: Same    PROCEDURE PERFORMED:  1. Hardware removal right ankle    SURGEON: Tan Douglas M.D.     ASSISTANT: Jerel Rogel MD    ANESTHESIOLOGIST: Zach Vasquez MD.    ANESTHESIA: General    ESTIMATED BLOOD LOSS: 0 mL    INDICATIONS: The patient is a 61 y.o. female with painful right ankle  hardware following operative repair.  The patient has had pain not resolved by conservative measures and was found to have a normal neurovascular exam and skin envelope.  Given these findings, hardware removal was indicated.  I discussed the risks and benefits of the procedure, including the risks of infection, wound healing complication, neurovascular injury, pain, malunion, non-union, malrotation, and the medical risks of anesthesia including DVT, PE, MI, stroke, and death.  Benefits include reduced pain and hardware complications in the future. Alternatives to surgery were also discussed, including non-operative management.  The patient signed the informed consent and the operative extremity was marked.        PROCEDURE:  The patient underwent anesthesia, and was positioned supine on a radiolucent table and all bony prominences were well padded.  Preoperative antibiotics were administered. Sequential compression devices were employed. The correct operative site was prepped and draped into a sterile field. A procedural pause was conducted to verify correct patient, correct extremity, presence of the surgeons initials on the operative extremity.     A well padded tourniquet was inflated to 250mmHg and right ankle hardware was removed without difficulty utilizing previous incision.  Wounds were irrigated with normal saline, and closed in layers, with  2-0 Vicryl in the subcutaneous tissue, and staples in the skin.  Sterile dressings were applied.      The patient tolerated the procedure well. There were no  apparent complications. All sponge, needle, and instrument counts were correct on two separate occasions. He was awakened, extubated, and transferred to the recovery room in satisfactory condition.        Post-Operative Plan:     1.  The patient should remain full weightbearing on their operative extremity.  Gait aids (crutch or crutches, cane, walker) may be used as needed, and may be discontinued when no longer required.  2.  IV antibiotics - may be continued for 24 hours, but are not required.  3.  Discharge planning   ____________________________________   Tan Douglas M.D.   DD: 3/17/2022  8:27 AM

## 2022-03-17 NOTE — DISCHARGE INSTRUCTIONS
ACTIVITY: Rest and take it easy for the first 24 hours.  A responsible adult is recommended to remain with you during that time.  It is normal to feel sleepy.  We encourage you to not do anything that requires balance, judgment or coordination.    MILD FLU-LIKE SYMPTOMS ARE NORMAL. YOU MAY EXPERIENCE GENERALIZED MUSCLE ACHES, THROAT IRRITATION, HEADACHE AND/OR SOME NAUSEA.    FOR 24 HOURS DO NOT:  Drive, operate machinery or run household appliances.  Drink beer or alcoholic beverages.   Make important decisions or sign legal documents.    SPECIAL INSTRUCTIONS:       Orthopedic Hardware Removal, Care After  This sheet gives you information about how to care for yourself after your procedure. Your health care provider may also give you more specific instructions. If you have problems or questions, contact your health care provider.  What can I expect after the procedure?  After the procedure, it is common to have:  · Soreness or pain.  · Some swelling in the area where the hardware was removed.  · A small amount of blood or clear fluid coming from your incision.  Follow these instructions at home:  If you have a cast:  · Do not stick anything inside the cast to scratch your skin. Doing that increases your risk of infection.  · Check the skin around the cast every day. Tell your health care provider about any concerns.  · You may put lotion on dry skin around the edges of the cast. Do not put lotion on the skin underneath the cast.  · Keep the cast clean and dry.  If you have a splint or boot:  · Wear the splint or boot as told by your health care provider. Remove it only as told by your health care provider.  · Loosen the splint or boot if your fingers or toes tingle, become numb, or turn cold and blue.  · Keep the splint or boot clean and dry.  Bathing  · Do not take baths, swim, or use a hot tub until your health care provider approves. Ask your health care provider if you may take showers. You may only be  allowed to take sponge baths.  · Keep the bandage (dressing) dry until your health care provider says it can be removed.  · If your cast, splint, or boot is not waterproof:  ? Do not let it get wet.  ? Cover it with a watertight covering when you take a bath or a shower.  Incision care    · Follow instructions from your health care provider about how to take care of your incision. Make sure you:  ? Wash your hands with soap and water before you change your dressing. If soap and water are not available, use hand .  ? Change your dressing as told by your health care provider.  ? Leave stitches (sutures), skin glue, or adhesive strips in place. These skin closures may need to stay in place for 2 weeks or longer. If adhesive strip edges start to loosen and curl up, you may trim the loose edges. Do not remove adhesive strips completely unless your health care provider tells you to do that.  · Check your incision area every day for signs of infection. Check for:  ? Redness.  ? More swelling or pain.  ? More fluid or blood.  ? Warmth.  ? Pus or a bad smell.  Managing pain, stiffness, and swelling    · If directed, put ice on the affected area:  ? If you have a removable splint or boot, remove it as told by your health care provider.  ? Put ice in a plastic bag.  ? Place a towel between your skin and the bag.  ? Leave the ice on for 20 minutes, 2-3 times a day.  · Move your fingers or toes often to avoid stiffness and to lessen swelling.  · Raise (elevate) the injured area above the level of your heart while you are sitting or lying down.  Driving  · Do not drive or use heavy machinery while taking prescription pain medicine.  · Do not drive for 24 hours if you were given a medicine to help you relax (sedative) during your procedure.  · Ask your health care provider when it is safe to drive if you have a cast, splint, or boot on the affected limb.  Activity  · Ask your health care provider what activities are safe  for you during recovery, and ask what activities you need to avoid.  · Do not use the injured limb to support your body weight until your health care provider says that you can.  · Do not play contact sports until your health care provider approves.  · Do exercises as told by your health care provider.  · Avoid sitting for a long time without moving. Get up and move around at least every few hours. This will help prevent blood clots.  General instructions  · Do not put pressure on any part of the cast or splint until it is fully hardened. This may take several hours.  · If you are taking prescription pain medicine, take actions to prevent or treat constipation. Your health care provider may recommend that you:  ? Drink enough fluid to keep your urine pale yellow.  ? Eat foods that are high in fiber, such as fresh fruits and vegetables, whole grains, and beans.  ? Limit foods that are high in fat and processed sugars, such as fried or sweet foods.  ? Take an over-the-counter or prescription medicine for constipation.  · Do not use any products that contain nicotine or tobacco, such as cigarettes and e-cigarettes. These can delay bone healing after surgery. If you need help quitting, ask your health care provider.  · Take over-the-counter and prescription medicines only as told by your health care provider.  · Keep all follow-up visits as told by your health care provider. This is important.  Contact a health care provider if:  · You have lasting pain.  · You have redness around your incision.  · You have more swelling or pain around your incision.  · You have more fluid or blood coming from your incision.  · Your incision feels warm to the touch.  · You have pus or a bad smell coming from your incision.  · You are unable to do exercises or physical activity as told by your health care provider.  Get help right away if:  · You have difficulty breathing.  · You have chest pain.  · You have severe pain.  · You have a  fever or chills.  · You have numbness for more than 24 hours in the area where the hardware was removed.  Summary  · After the procedure, it is common to have some pain and swelling in the area where the hardware was removed.  · Follow instructions from your health care provider about how to take care of your incision.  · Return to your normal activities as told by your health care provider. Ask your health care provider what activities are safe for you.        DIET: To avoid nausea, slowly advance diet as tolerated, avoiding spicy or greasy foods for the first day.  Add more substantial food to your diet according to your physician's instructions.  Babies can be fed formula or breast milk as soon as they are hungry.  INCREASE FLUIDS AND FIBER TO AVOID CONSTIPATION.    SURGICAL DRESSING/BATHING: Keep dressing clean and dry. Do not soak or submerge in water until cleared by MD.     FOLLOW-UP APPOINTMENT:  A follow-up appointment should be arranged with your doctor in 1-2 weeks; call to schedule.    You should CALL YOUR PHYSICIAN if you develop:  Fever greater than 101 degrees F.  Pain not relieved by medication, or persistent nausea or vomiting.  Excessive bleeding (blood soaking through dressing) or unexpected drainage from the wound.  Extreme redness or swelling around the incision site, drainage of pus or foul smelling drainage.  Inability to urinate or empty your bladder within 8 hours.  Problems with breathing or chest pain.    You should call 911 if you develop problems with breathing or chest pain.  If you are unable to contact your doctor or surgical center, you should go to the nearest emergency room or urgent care center.  Physician's telephone #: [phone]    If any questions arise, call your doctor.  If your doctor is not available, please feel free to call the Surgical Center at [phone].     A registered nurse may call you a few days after your surgery to see how you are doing after your  procedure.    MEDICATIONS: Resume taking daily medication.  Take prescribed pain medication with food.  If no medication is prescribed, you may take non-aspirin pain medication if needed.  PAIN MEDICATION CAN BE VERY CONSTIPATING.  Take a stool softener or laxative such as senokot, pericolace, or milk of magnesia if needed.    Prescription given for Oxycodone.  Last pain medication given at 8:54am (Can have next pain medication at 12:54pm).    If your physician has prescribed pain medication that includes Acetaminophen (Tylenol), do not take additional Acetaminophen (Tylenol) while taking the prescribed medication.    Depression / Suicide Risk    As you are discharged from this [facility] facility, it is important to learn how to keep safe from harming yourself.    Recognize the warning signs:  · Abrupt changes in personality, positive or negative- including increase in energy   · Giving away possessions  · Change in eating patterns- significant weight changes-  positive or negative  · Change in sleeping patterns- unable to sleep or sleeping all the time   · Unwillingness or inability to communicate  · Depression  · Unusual sadness, discouragement and loneliness  · Talk of wanting to die  · Neglect of personal appearance   · Rebelliousness- reckless behavior  · Withdrawal from people/activities they love  · Confusion- inability to concentrate     If you or a loved one observes any of these behaviors or has concerns about self-harm, here's what you can do:  · Talk about it- your feelings and reasons for harming yourself  · Remove any means that you might use to hurt yourself (examples: pills, rope, extension cords, firearm)  · Get professional help from the community (Mental Health, Substance Abuse, psychological counseling)  · Do not be alone:Call your Safe Contact- someone whom you trust who will be there for you.  · Call your local CRISIS HOTLINE 824-9440 or 680-451-2366  · Call your local Children's Mobile  Crisis Response Team [place] (894) 859-7864 or www.[employer]  · Call the toll free National Suicide Prevention Hotlines   · National Suicide Prevention Lifeline 811-064-JXAD (5002)  · National Hope Line Network 800-SUICIDE (362-5582)

## 2022-05-13 ENCOUNTER — HOSPITAL ENCOUNTER (OUTPATIENT)
Dept: RADIOLOGY | Facility: MEDICAL CENTER | Age: 62
End: 2022-05-13
Attending: STUDENT IN AN ORGANIZED HEALTH CARE EDUCATION/TRAINING PROGRAM
Payer: MEDICAID

## 2022-05-13 DIAGNOSIS — Z12.2 SCREENING FOR MALIGNANT NEOPLASM OF RESPIRATORY ORGAN: ICD-10-CM

## 2022-05-13 DIAGNOSIS — Z12.31 VISIT FOR SCREENING MAMMOGRAM: ICD-10-CM

## 2022-05-13 DIAGNOSIS — F17.210 CIGARETTE NICOTINE DEPENDENCE, UNCOMPLICATED: ICD-10-CM

## 2022-05-13 DIAGNOSIS — Z87.891 PERSONAL HISTORY OF TOBACCO USE, PRESENTING HAZARDS TO HEALTH: ICD-10-CM

## 2022-05-13 PROCEDURE — 77063 BREAST TOMOSYNTHESIS BI: CPT

## 2022-05-13 PROCEDURE — 71250 CT THORAX DX C-: CPT

## 2023-01-26 NOTE — PROGRESS NOTES
Date of service 23 - rescheduled from 23    Subjective    Chief Complaint:  Iron overload    HPI:  62 female referred for consultation by Anais Webb P.A.-C because of an elevated ferritin and recent fidning of heterozygosity for hemochromatosis H63D. Lab accompanying the referral includes a ferritin of 1059 and an iron/TIBC of 221/238 with 93% saturation. H/H was 14.6/43.5 on an undated lab result. Unaware of a family history but one brother has had a liver transplant. Drinks 6 to 12 beers a day. Smokes. Former meth user.     ROS:    Constitutional: No weight loss  Skin: No rash or jaundice  HENT: No change in eyesight or hearing  Cardiovascular:No chest pain or arrythmia  Respiratory:No cough or SOB  GI:No nausea, vomiting, diarrhea, constipation  :No dysuria or frequency  Musculoskeletal:No bone or joint pain  Neuro:No sx's of neuropathy  Psych: No complaints    PMH:      Allergies   Allergen Reactions    Atorvastatin     Tape Rash     Paper tape reaction,        Past Medical History:   Diagnosis Date    Blood clotting disorder (HCC)     LLE - 3 blood clots      Dental disorder     top denture    Heart burn     High cholesterol     HTN (hypertension)     Psychiatric problem         Past Surgical History:   Procedure Laterality Date    HARDWARE REMOVAL ORTHO Right 3/17/2022    Procedure: REMOVAL, HARDWARE - ANKLE;  Surgeon: Tan Douglas M.D.;  Location: [facility];  Service: Orthopedics    ORIF, ANKLE  2012    Performed by Tan Douglas M.D. at [facility] ORS    CO ANESTH, DELIVERY ONLY  1990    GYN SURGERY      PRIMARY C SECTION          Medications:    Current Outpatient Medications on File Prior to Encounter   Medication Sig Dispense Refill    lisinopril (PRINIVIL) 10 MG Tab Take 10 mg by mouth every day.      metroNIDAZOLE (FLAGYL) 500 MG Tab Take 500 mg by mouth 2 times a day. FOR 7 DAYS      lisinopril (PRINIVIL) 20 MG Tab Take 1 Tablet  "by mouth every day. 30 Tablet 4    ibuprofen (MOTRIN) 400 MG TABS Take 1 Tab by mouth every 8 hours as needed for Mild Pain. 30 Tab 0    triamcinolone acetonide (KENALOG) 0.1 % Ointment APPLY TOPICALLY TO AFFECTED AREA TWICE DAILY FOR 30 DAYS (Patient not taking: Reported on 3/15/2022)      hydrOXYzine pamoate (VISTARIL) 25 MG Cap TAKE 1 CAPSULE BY MOUTH THREE TIMES DAILY AS NEEDED FOR ITCHING FOR UP TO 30 DAYS (Patient not taking: Reported on 3/15/2022)      benzonatate (TESSALON) 100 MG Cap Take 2 Capsules by mouth 3 times a day as needed for Cough. (Patient not taking: Reported on 3/15/2022) 20 Capsule 0    silver sulfADIAZINE (SILVADENE) 1 % Cream Apply to the affected area (Patient not taking: Reported on 4/26/2023) 1 Each 3    hydrocortisone 2.5 % CREA Apply 1 Application to affected area(s) 2 times a day. Apply to affected area(s) 2 times a day. (Patient not taking: Reported on 3/15/2022) 15 g 0    ergocalciferol (DRISDOL) 28559 UNIT capsule Take 1 Cap by mouth every Monday. (Patient not taking: Reported on 3/15/2022) 4 Cap 1    clotrimazole (LOTRIMIN) 1 % CREA Apply Bid in between toes x 2 weeks (Patient not taking: Reported on 3/15/2022) 1 Tube 1     No current facility-administered medications on file prior to encounter.       Social History     Tobacco Use    Smoking status: Every Day     Packs/day: 0.25     Years: 10.00     Pack years: 2.50     Types: Cigarettes    Smokeless tobacco: Never   Substance Use Topics    Alcohol use: Yes     Comment: 1 per month        Family History   Problem Relation Age of Onset    Cancer Mother     Heart Disease Father     Stroke Father     Diabetes Maternal Grandmother         Objective    Vitals:    BP (!) 152/78 (BP Location: Right arm, Patient Position: Sitting, BP Cuff Size: Adult)   Pulse 95   Temp 36.2 °C (97.2 °F) (Temporal)   Resp 16   Ht 1.575 m (5' 2.01\")   Wt 82.7 kg (182 lb 6.9 oz)   LMP 11/29/2012   SpO2 95%   BMI 33.36 kg/m²     Physical " Exam:    Appears well-developed, somewhat overweight.  No distress.    Head -  Normocephalic .   Eyes - Pupils are equal. Conjunctivae normal. No scleral icterus.   Ears - normal hearing  Neck - Neck supple. No thyromegaly  Mouth - edentulous  Cardiovascular - Normal rate, regular rhythm, normal heart sounds and intact distal pulses. No  gallop, murmur or rub  Pulmonary - Normal breath sounds.  No wheeze, rales or rhonchi  Breast - symmetrical. No mass on indentation.  Abdominal -Soft. No distension, tenderness, organomegaly or mass.   Extremities-  No edema or tenderness.    Nodes - No submental, submandibular, preauricular, cervical, axillary or inguinal adenopathy.    Neurological -   Alert and oriented.  Skin - Skin is warm and dry. No rash noted. Not diaphoretic. No erythema. No pallor. No jaundice   Psychiatric -  Normal mood and affect.    Labs:  See PI    Assessment  H63 heterozygote  Imp:    Visit Diagnosis:    1. Hereditary hemochromatosis (HCC)          Plan:  Needs monthly therapeutic phlebotomy  Advised to decrease ETOH  Return after 6 phlebotomies      Jovanny Martinez M.D.

## 2023-04-26 ENCOUNTER — HOSPITAL ENCOUNTER (OUTPATIENT)
Dept: HEMATOLOGY ONCOLOGY | Facility: MEDICAL CENTER | Age: 63
End: 2023-04-26
Attending: INTERNAL MEDICINE
Payer: MEDICAID

## 2023-04-26 VITALS
SYSTOLIC BLOOD PRESSURE: 152 MMHG | HEIGHT: 62 IN | WEIGHT: 182.43 LBS | DIASTOLIC BLOOD PRESSURE: 78 MMHG | OXYGEN SATURATION: 95 % | HEART RATE: 95 BPM | BODY MASS INDEX: 33.57 KG/M2 | TEMPERATURE: 97.2 F | RESPIRATION RATE: 16 BRPM

## 2023-04-26 DIAGNOSIS — E83.110 HEREDITARY HEMOCHROMATOSIS (HCC): ICD-10-CM

## 2023-04-26 PROCEDURE — 99205 OFFICE O/P NEW HI 60 MIN: CPT | Performed by: INTERNAL MEDICINE

## 2023-04-26 PROCEDURE — 99212 OFFICE O/P EST SF 10 MIN: CPT | Performed by: INTERNAL MEDICINE

## 2023-04-26 RX ORDER — SODIUM CHLORIDE 9 MG/ML
500 INJECTION, SOLUTION INTRAVENOUS ONCE
Status: CANCELLED | OUTPATIENT
Start: 2023-05-03 | End: 2023-05-03

## 2023-04-26 ASSESSMENT — PAIN SCALES - GENERAL: PAINLEVEL: NO PAIN

## 2023-04-26 ASSESSMENT — FIBROSIS 4 INDEX: FIB4 SCORE: 2.26

## 2023-05-10 ENCOUNTER — OUTPATIENT INFUSION SERVICES (OUTPATIENT)
Dept: ONCOLOGY | Facility: MEDICAL CENTER | Age: 63
End: 2023-05-10
Attending: INTERNAL MEDICINE
Payer: MEDICAID

## 2023-05-10 VITALS
RESPIRATION RATE: 18 BRPM | BODY MASS INDEX: 33.35 KG/M2 | HEART RATE: 94 BPM | WEIGHT: 181.22 LBS | SYSTOLIC BLOOD PRESSURE: 153 MMHG | HEIGHT: 62 IN | TEMPERATURE: 96.7 F | OXYGEN SATURATION: 94 % | DIASTOLIC BLOOD PRESSURE: 86 MMHG

## 2023-05-10 DIAGNOSIS — E83.110 HEREDITARY HEMOCHROMATOSIS (HCC): ICD-10-CM

## 2023-05-10 RX ORDER — NALTREXONE HYDROCHLORIDE 50 MG/1
50 TABLET, FILM COATED ORAL
COMMUNITY
Start: 2023-03-27

## 2023-05-10 RX ORDER — ATORVASTATIN CALCIUM 10 MG/1
TABLET, FILM COATED ORAL
COMMUNITY
Start: 2023-04-24

## 2023-05-10 RX ORDER — SODIUM CHLORIDE 9 MG/ML
500 INJECTION, SOLUTION INTRAVENOUS ONCE
Status: CANCELLED | OUTPATIENT
Start: 2023-05-10 | End: 2023-05-10

## 2023-05-10 ASSESSMENT — FIBROSIS 4 INDEX: FIB4 SCORE: 2.26

## 2023-05-10 NOTE — PROGRESS NOTES
Pt arrived to IS, ambulatory, for new therapeutic phlebotomy. Pt very anxious and unsure about what procedure she is having done today. Thorough education provided about therapeutic phlebotomy and potential side effects. Pt does not wish to have TP done today. Pt states she has not hydrated at all, and was consuming alcohol last night. Pt wishes to reschedule TP appointment. Pt educated about hemochromatosis and need for TP, pt requesting to come back when she has adequately hydrated and prepared. Pt left IS with no s/sx of distress. Follow up appointment confirmed.

## 2023-05-24 ENCOUNTER — OUTPATIENT INFUSION SERVICES (OUTPATIENT)
Dept: ONCOLOGY | Facility: MEDICAL CENTER | Age: 63
End: 2023-05-24
Attending: INTERNAL MEDICINE
Payer: MEDICAID

## 2023-05-24 VITALS
WEIGHT: 181.88 LBS | OXYGEN SATURATION: 98 % | SYSTOLIC BLOOD PRESSURE: 164 MMHG | TEMPERATURE: 98 F | BODY MASS INDEX: 33.47 KG/M2 | RESPIRATION RATE: 18 BRPM | HEIGHT: 62 IN | DIASTOLIC BLOOD PRESSURE: 94 MMHG | HEART RATE: 81 BPM

## 2023-05-24 DIAGNOSIS — E83.110 HEREDITARY HEMOCHROMATOSIS (HCC): ICD-10-CM

## 2023-05-24 LAB
HCT VFR BLD AUTO: 41.2 % (ref 37–47)
HGB BLD-MCNC: 14.7 G/DL (ref 12–16)

## 2023-05-24 PROCEDURE — 85014 HEMATOCRIT: CPT

## 2023-05-24 PROCEDURE — 85018 HEMOGLOBIN: CPT

## 2023-05-24 PROCEDURE — 36415 COLL VENOUS BLD VENIPUNCTURE: CPT

## 2023-05-24 PROCEDURE — 99195 PHLEBOTOMY: CPT

## 2023-05-24 RX ORDER — SODIUM CHLORIDE 9 MG/ML
500 INJECTION, SOLUTION INTRAVENOUS ONCE
Status: CANCELLED | OUTPATIENT
Start: 2023-05-24 | End: 2023-05-24

## 2023-05-24 ASSESSMENT — FIBROSIS 4 INDEX: FIB4 SCORE: 2.26

## 2023-05-25 NOTE — PROGRESS NOTES
Meggan arrives for the therapeutic phlebotomy. PIV established; labs drawn as ordered, however PIV would not flush. PIV removed; gauze/coban applied to site. Meggan's Hgb 14.7 , Hct 41.2 %. New PIV established; brisk blood return noted. 500 cc blood removed. Meggan tolerated well. Orthostatic vitals stable, see flowsheet. Meggan denies chest pain, shortness of breath, dizziness, or lightheadedness after treatment. Next appointment scheduled. Discharged to self care; no apparent distress noted.

## 2023-06-21 ENCOUNTER — OUTPATIENT INFUSION SERVICES (OUTPATIENT)
Dept: ONCOLOGY | Facility: MEDICAL CENTER | Age: 63
End: 2023-06-21
Attending: INTERNAL MEDICINE
Payer: MEDICAID

## 2023-06-21 VITALS
HEART RATE: 83 BPM | DIASTOLIC BLOOD PRESSURE: 86 MMHG | WEIGHT: 178.57 LBS | OXYGEN SATURATION: 94 % | RESPIRATION RATE: 18 BRPM | TEMPERATURE: 98 F | SYSTOLIC BLOOD PRESSURE: 142 MMHG | HEIGHT: 61 IN | BODY MASS INDEX: 33.71 KG/M2

## 2023-06-21 DIAGNOSIS — E83.110 HEREDITARY HEMOCHROMATOSIS (HCC): ICD-10-CM

## 2023-06-21 LAB
FERRITIN SERPL-MCNC: 825 NG/ML (ref 10–291)
HCT VFR BLD AUTO: 41.7 % (ref 37–47)
HGB BLD-MCNC: 14.4 G/DL (ref 12–16)

## 2023-06-21 PROCEDURE — 85014 HEMATOCRIT: CPT

## 2023-06-21 PROCEDURE — 99195 PHLEBOTOMY: CPT

## 2023-06-21 PROCEDURE — 85018 HEMOGLOBIN: CPT

## 2023-06-21 PROCEDURE — 82728 ASSAY OF FERRITIN: CPT

## 2023-06-21 RX ORDER — SODIUM CHLORIDE 9 MG/ML
500 INJECTION, SOLUTION INTRAVENOUS ONCE
Status: CANCELLED | OUTPATIENT
Start: 2023-06-21 | End: 2023-06-21

## 2023-06-21 ASSESSMENT — FIBROSIS 4 INDEX: FIB4 SCORE: 2.26

## 2023-06-21 NOTE — PROGRESS NOTES
Pt arrives for therapeutic phlebotomy for Hereditary hemochromatosis. Labs drawn as ordered by MD. Pt's Hgb = 14.4 , Hct = 41.7 %, Ferritin 825. Teaching and education reinforcement and emotional support provided. PIV started in L AC with good blood return. 1 unit of whole blood removed (500 cc). Pt tolerated well. Orthostatic vitals stable, see flow sheet. Pt denies chest pain, shortness of breath, dizziness, or lightheadedness after treatment. PIV removed, catheter intact, gauze and coban applied to site. Pt DC'd home to self care in good condition and in NAD. Appointment confirm for next treatment.

## 2023-07-19 ENCOUNTER — OUTPATIENT INFUSION SERVICES (OUTPATIENT)
Dept: ONCOLOGY | Facility: MEDICAL CENTER | Age: 63
End: 2023-07-19
Attending: STUDENT IN AN ORGANIZED HEALTH CARE EDUCATION/TRAINING PROGRAM
Payer: MEDICAID

## 2023-07-19 VITALS
TEMPERATURE: 97 F | WEIGHT: 179.68 LBS | HEIGHT: 61 IN | RESPIRATION RATE: 18 BRPM | SYSTOLIC BLOOD PRESSURE: 153 MMHG | DIASTOLIC BLOOD PRESSURE: 88 MMHG | OXYGEN SATURATION: 96 % | HEART RATE: 88 BPM | BODY MASS INDEX: 33.92 KG/M2

## 2023-07-19 DIAGNOSIS — E83.110 HEREDITARY HEMOCHROMATOSIS (HCC): ICD-10-CM

## 2023-07-19 LAB
FERRITIN SERPL-MCNC: 566 NG/ML (ref 10–291)
HCT VFR BLD AUTO: 38.5 % (ref 37–47)
HGB BLD-MCNC: 13.4 G/DL (ref 12–16)

## 2023-07-19 PROCEDURE — 85014 HEMATOCRIT: CPT

## 2023-07-19 PROCEDURE — 82728 ASSAY OF FERRITIN: CPT

## 2023-07-19 PROCEDURE — 36415 COLL VENOUS BLD VENIPUNCTURE: CPT

## 2023-07-19 PROCEDURE — 85018 HEMOGLOBIN: CPT

## 2023-07-19 RX ORDER — SODIUM CHLORIDE 9 MG/ML
500 INJECTION, SOLUTION INTRAVENOUS ONCE
Status: CANCELLED | OUTPATIENT
Start: 2023-07-19 | End: 2023-07-19

## 2023-07-19 ASSESSMENT — FIBROSIS 4 INDEX: FIB4 SCORE: 2.26

## 2023-07-19 NOTE — PROGRESS NOTES
Pt arrived ambulatory to IS for Q 4 week TP.  POC discussed and pt verbalized agreement. PIV placed without difficulty and labs drawn. Hct 38.5 and per treatment plan, pt does not meet treatment parameters, provider is unavailable today. PIV removed, sterile gauze/coban applied to site, and next month's appt printed for pt. Pt updated on labs and verbalized understanding. Pt discharged ambulatory in NAD.

## 2023-08-16 ENCOUNTER — OUTPATIENT INFUSION SERVICES (OUTPATIENT)
Dept: ONCOLOGY | Facility: MEDICAL CENTER | Age: 63
End: 2023-08-16
Attending: INTERNAL MEDICINE
Payer: MEDICAID

## 2023-08-16 VITALS
RESPIRATION RATE: 16 BRPM | HEIGHT: 61 IN | DIASTOLIC BLOOD PRESSURE: 73 MMHG | TEMPERATURE: 97.2 F | HEART RATE: 62 BPM | OXYGEN SATURATION: 95 % | SYSTOLIC BLOOD PRESSURE: 153 MMHG | WEIGHT: 183.42 LBS | BODY MASS INDEX: 34.63 KG/M2

## 2023-08-16 DIAGNOSIS — E83.110 HEREDITARY HEMOCHROMATOSIS (HCC): ICD-10-CM

## 2023-08-16 LAB
FERRITIN SERPL-MCNC: 324 NG/ML (ref 10–291)
HCT VFR BLD AUTO: 39.7 % (ref 37–47)
HGB BLD-MCNC: 13.6 G/DL (ref 12–16)

## 2023-08-16 PROCEDURE — 99195 PHLEBOTOMY: CPT

## 2023-08-16 PROCEDURE — 85018 HEMOGLOBIN: CPT

## 2023-08-16 PROCEDURE — 82728 ASSAY OF FERRITIN: CPT

## 2023-08-16 PROCEDURE — 85014 HEMATOCRIT: CPT

## 2023-08-16 RX ORDER — SODIUM CHLORIDE 9 MG/ML
500 INJECTION, SOLUTION INTRAVENOUS ONCE
Status: CANCELLED | OUTPATIENT
Start: 2023-08-16 | End: 2023-08-16

## 2023-08-16 ASSESSMENT — FIBROSIS 4 INDEX: FIB4 SCORE: 2.26

## 2023-08-16 NOTE — PROGRESS NOTES
Pt arrives for therapeutic phlebotomy for Hereditary hemochromatosis. Reports tolerating prior treatment well. Labs drawn as ordered by MD. Pt's Hgb = 13.6 , Hct = 39.7%, Ferritin 324. Talked to  and orders received to only follow Hgb for HOLDING treatment parameter starting with next treatment, and okay to phlebotomized today. Teaching and education reinforcement and emotional support provided. PIV started in L AC with good blood return. 1 unit of whole blood removed (500 cc). Pt tolerated well. Orthostatic vitals stable, see flow sheet. Pt denies chest pain, shortness of breath, dizziness, or lightheadedness after treatment. PIV removed, catheter intact, gauze and coban applied to site. Pt DC'd home to self care in good condition and in NAD. Appointment confirm for next treatment.

## 2023-09-13 ENCOUNTER — OUTPATIENT INFUSION SERVICES (OUTPATIENT)
Dept: ONCOLOGY | Facility: MEDICAL CENTER | Age: 63
End: 2023-09-13
Attending: INTERNAL MEDICINE
Payer: MEDICAID

## 2023-09-13 VITALS
BODY MASS INDEX: 33.42 KG/M2 | SYSTOLIC BLOOD PRESSURE: 112 MMHG | RESPIRATION RATE: 18 BRPM | OXYGEN SATURATION: 97 % | HEART RATE: 92 BPM | TEMPERATURE: 97.3 F | HEIGHT: 61 IN | WEIGHT: 177.03 LBS | DIASTOLIC BLOOD PRESSURE: 74 MMHG

## 2023-09-13 DIAGNOSIS — E83.110 HEREDITARY HEMOCHROMATOSIS (HCC): ICD-10-CM

## 2023-09-13 LAB
FERRITIN SERPL-MCNC: 215 NG/ML (ref 10–291)
HCT VFR BLD AUTO: 41.3 % (ref 37–47)
HGB BLD-MCNC: 15.4 G/DL (ref 12–16)

## 2023-09-13 PROCEDURE — 85018 HEMOGLOBIN: CPT

## 2023-09-13 PROCEDURE — 99195 PHLEBOTOMY: CPT

## 2023-09-13 PROCEDURE — 85014 HEMATOCRIT: CPT

## 2023-09-13 PROCEDURE — 82728 ASSAY OF FERRITIN: CPT

## 2023-09-13 RX ORDER — SODIUM CHLORIDE 9 MG/ML
500 INJECTION, SOLUTION INTRAVENOUS ONCE
Status: CANCELLED | OUTPATIENT
Start: 2023-09-13 | End: 2023-09-13

## 2023-09-13 RX ORDER — TRAZODONE HYDROCHLORIDE 50 MG/1
TABLET ORAL
COMMUNITY
Start: 2023-08-23

## 2023-09-13 RX ORDER — CEFUROXIME AXETIL 250 MG/1
TABLET ORAL
COMMUNITY
Start: 2023-09-06

## 2023-09-13 RX ORDER — ESCITALOPRAM OXALATE 10 MG/1
TABLET ORAL
COMMUNITY
Start: 2023-09-06

## 2023-09-13 ASSESSMENT — FIBROSIS 4 INDEX: FIB4 SCORE: 2.3

## 2023-09-14 NOTE — PROGRESS NOTES
Meggan to infusion for monthly TP today. Reports feeling well with no major complaints, has a UTI for which she will be starting PO ABX today. PIV started with positive blood return and labs drawn. Labs reviewed by RN, Hgb: 15.4, within parameters for therapeutic phlebotomy today. 500ml whole blood removed over 15 minutes followed by 10 minute observation period, patient tolerated well with no adverse effects, orthostatic VSS. PIV flushed post infusion with positive blood return, d/amos with tip intact. Patient left in stable condition, knows when to return for next appt.

## 2023-10-11 ENCOUNTER — OUTPATIENT INFUSION SERVICES (OUTPATIENT)
Dept: ONCOLOGY | Facility: MEDICAL CENTER | Age: 63
End: 2023-10-11
Attending: INTERNAL MEDICINE
Payer: MEDICAID

## 2023-10-11 VITALS
HEART RATE: 72 BPM | SYSTOLIC BLOOD PRESSURE: 156 MMHG | RESPIRATION RATE: 18 BRPM | BODY MASS INDEX: 34.3 KG/M2 | HEIGHT: 61 IN | DIASTOLIC BLOOD PRESSURE: 84 MMHG | OXYGEN SATURATION: 97 % | WEIGHT: 181.66 LBS | TEMPERATURE: 98.7 F

## 2023-10-11 DIAGNOSIS — E83.110 HEREDITARY HEMOCHROMATOSIS (HCC): ICD-10-CM

## 2023-10-11 LAB
FERRITIN SERPL-MCNC: 155 NG/ML (ref 10–291)
HCT VFR BLD AUTO: 40.8 % (ref 37–47)
HGB BLD-MCNC: 13.9 G/DL (ref 12–16)

## 2023-10-11 PROCEDURE — 82728 ASSAY OF FERRITIN: CPT

## 2023-10-11 PROCEDURE — 85014 HEMATOCRIT: CPT

## 2023-10-11 PROCEDURE — 99195 PHLEBOTOMY: CPT

## 2023-10-11 PROCEDURE — 85018 HEMOGLOBIN: CPT

## 2023-10-11 RX ORDER — SODIUM CHLORIDE 9 MG/ML
500 INJECTION, SOLUTION INTRAVENOUS ONCE
Status: CANCELLED | OUTPATIENT
Start: 2023-10-11 | End: 2023-10-11

## 2023-10-11 ASSESSMENT — FIBROSIS 4 INDEX: FIB4 SCORE: 2.3

## 2023-10-11 NOTE — PROGRESS NOTES
Pt arrived ambulatory for TP, denies c/o, states she is feeling good today.  PIV started in RAC x 1 attempt, labs drawn.  Hgb 13.9, phlebotomy done per protocol, 500cc blood removed over 15 minutes.  Pt observed for 15 minutes after, VS stable, Dc'd home without incident and will return as scheduled.

## 2023-11-08 ENCOUNTER — OUTPATIENT INFUSION SERVICES (OUTPATIENT)
Dept: ONCOLOGY | Facility: MEDICAL CENTER | Age: 63
End: 2023-11-08
Attending: INTERNAL MEDICINE
Payer: MEDICAID

## 2023-11-08 VITALS
SYSTOLIC BLOOD PRESSURE: 119 MMHG | OXYGEN SATURATION: 98 % | HEIGHT: 61 IN | BODY MASS INDEX: 34.55 KG/M2 | WEIGHT: 182.98 LBS | HEART RATE: 72 BPM | DIASTOLIC BLOOD PRESSURE: 66 MMHG | TEMPERATURE: 98 F | RESPIRATION RATE: 18 BRPM

## 2023-11-08 DIAGNOSIS — E83.110 HEREDITARY HEMOCHROMATOSIS (HCC): ICD-10-CM

## 2023-11-08 LAB
FERRITIN SERPL-MCNC: 122 NG/ML (ref 10–291)
HCT VFR BLD AUTO: 41 % (ref 37–47)
HGB BLD-MCNC: 14.6 G/DL (ref 12–16)

## 2023-11-08 PROCEDURE — 99195 PHLEBOTOMY: CPT

## 2023-11-08 PROCEDURE — 85018 HEMOGLOBIN: CPT

## 2023-11-08 PROCEDURE — 85014 HEMATOCRIT: CPT

## 2023-11-08 PROCEDURE — 82728 ASSAY OF FERRITIN: CPT

## 2023-11-08 RX ORDER — SODIUM CHLORIDE 9 MG/ML
500 INJECTION, SOLUTION INTRAVENOUS ONCE
Status: CANCELLED | OUTPATIENT
Start: 2023-11-08 | End: 2023-11-08

## 2023-11-08 NOTE — PROGRESS NOTES
Pt arrived ambulatory for TP, denies c/o, states she is feeling good today.  PIV started in LAC x 1 attempt, labs drawn.  Hgb 14.6, phlebotomy done per protocol, 500cc blood removed over 15 minutes.  Pt observed for 15 minutes after, VS stable, Dc'd home without incident and will return as scheduled.

## 2023-12-06 ENCOUNTER — OUTPATIENT INFUSION SERVICES (OUTPATIENT)
Dept: ONCOLOGY | Facility: MEDICAL CENTER | Age: 63
End: 2023-12-06
Attending: INTERNAL MEDICINE
Payer: MEDICAID

## 2023-12-06 VITALS
OXYGEN SATURATION: 96 % | WEIGHT: 184.08 LBS | SYSTOLIC BLOOD PRESSURE: 177 MMHG | TEMPERATURE: 96.7 F | HEART RATE: 82 BPM | BODY MASS INDEX: 34.76 KG/M2 | DIASTOLIC BLOOD PRESSURE: 88 MMHG | HEIGHT: 61 IN | RESPIRATION RATE: 16 BRPM

## 2023-12-06 DIAGNOSIS — E83.110 HEREDITARY HEMOCHROMATOSIS (HCC): ICD-10-CM

## 2023-12-06 LAB
FERRITIN SERPL-MCNC: 124 NG/ML (ref 10–291)
HCT VFR BLD AUTO: 38.1 % (ref 37–47)
HGB BLD-MCNC: 13.3 G/DL (ref 12–16)

## 2023-12-06 PROCEDURE — 85014 HEMATOCRIT: CPT

## 2023-12-06 PROCEDURE — 85018 HEMOGLOBIN: CPT

## 2023-12-06 PROCEDURE — 99195 PHLEBOTOMY: CPT

## 2023-12-06 PROCEDURE — 82728 ASSAY OF FERRITIN: CPT

## 2023-12-06 RX ORDER — SODIUM CHLORIDE 9 MG/ML
500 INJECTION, SOLUTION INTRAVENOUS ONCE
Status: CANCELLED | OUTPATIENT
Start: 2023-12-06 | End: 2023-12-06

## 2023-12-06 NOTE — PROGRESS NOTES
Meggan presents to IS for therapeutic phlebotomy.  PIV placed to left AC, labs collected and reviewed.  Hgb 13.3, parameters met for TP. 500 cc whole blood removed. Meggan tolerated well.  Orthostatic vital signs wnl, see flowesheet.  Meggan denies s/s chest pain, shortness of breath, dizziness or light-headedness after treatment.  PIV flushed and removed, gauze and coban to site.  Meggan discharged in NAD, next appointment confirmed.

## 2024-01-03 ENCOUNTER — OUTPATIENT INFUSION SERVICES (OUTPATIENT)
Dept: ONCOLOGY | Facility: MEDICAL CENTER | Age: 64
End: 2024-01-03
Attending: INTERNAL MEDICINE
Payer: MEDICAID

## 2024-01-03 VITALS
RESPIRATION RATE: 18 BRPM | TEMPERATURE: 97.7 F | WEIGHT: 183.64 LBS | SYSTOLIC BLOOD PRESSURE: 153 MMHG | OXYGEN SATURATION: 97 % | HEART RATE: 90 BPM | HEIGHT: 61 IN | DIASTOLIC BLOOD PRESSURE: 98 MMHG | BODY MASS INDEX: 34.67 KG/M2

## 2024-01-03 DIAGNOSIS — E83.110 HEREDITARY HEMOCHROMATOSIS (HCC): ICD-10-CM

## 2024-01-03 LAB
FERRITIN SERPL-MCNC: 78.7 NG/ML (ref 10–291)
HCT VFR BLD AUTO: 39 % (ref 37–47)
HGB BLD-MCNC: 13.7 G/DL (ref 12–16)

## 2024-01-03 PROCEDURE — 85018 HEMOGLOBIN: CPT

## 2024-01-03 PROCEDURE — 82728 ASSAY OF FERRITIN: CPT

## 2024-01-03 PROCEDURE — 85014 HEMATOCRIT: CPT

## 2024-01-03 PROCEDURE — 99195 PHLEBOTOMY: CPT

## 2024-01-03 RX ORDER — SODIUM CHLORIDE 9 MG/ML
500 INJECTION, SOLUTION INTRAVENOUS ONCE
OUTPATIENT
Start: 2024-01-03 | End: 2024-01-03

## 2024-01-03 NOTE — PROGRESS NOTES
Pt arrived ambulatory for Q4 week TP, denies c/o, states she is tolerating treatment well despite fatigue.  PIV started in LAC, labs drawn. Hgb 13.7, meets parameters for TP.  500cc blood removed over 15 minutes without difficulty, pt tolerated well. VS throughout visit show BP very elevated, pt has primary MD appt next week and will f/u to seek treatment for high BP. Pt Dc'd home without incident and will f/u as scheduled, printout given of next appt and scheduling emailed to add another appt.

## 2024-04-24 ENCOUNTER — OUTPATIENT INFUSION SERVICES (OUTPATIENT)
Dept: ONCOLOGY | Facility: MEDICAL CENTER | Age: 64
End: 2024-04-24
Attending: INTERNAL MEDICINE
Payer: MEDICAID

## 2024-04-24 VITALS
SYSTOLIC BLOOD PRESSURE: 141 MMHG | RESPIRATION RATE: 18 BRPM | DIASTOLIC BLOOD PRESSURE: 79 MMHG | WEIGHT: 183.86 LBS | HEART RATE: 78 BPM | HEIGHT: 61 IN | TEMPERATURE: 97.1 F | OXYGEN SATURATION: 95 % | BODY MASS INDEX: 34.71 KG/M2

## 2024-04-24 DIAGNOSIS — E83.110 HEREDITARY HEMOCHROMATOSIS (HCC): ICD-10-CM

## 2024-04-24 LAB
FERRITIN SERPL-MCNC: 56.7 NG/ML (ref 10–291)
HCT VFR BLD AUTO: 39.2 % (ref 37–47)
HGB BLD-MCNC: 13.9 G/DL (ref 12–16)

## 2024-04-24 PROCEDURE — 99195 PHLEBOTOMY: CPT

## 2024-04-24 PROCEDURE — 85014 HEMATOCRIT: CPT

## 2024-04-24 PROCEDURE — 85018 HEMOGLOBIN: CPT

## 2024-04-24 PROCEDURE — 82728 ASSAY OF FERRITIN: CPT

## 2024-04-24 RX ORDER — SODIUM CHLORIDE 9 MG/ML
500 INJECTION, SOLUTION INTRAVENOUS ONCE
Status: CANCELLED | OUTPATIENT
Start: 2024-04-24 | End: 2024-04-24

## 2024-04-24 NOTE — PROGRESS NOTES
Pt presented to Infusion for Therapeutic Phlebotomy. POC discussed and pt verbalized understanding, denies illness today. PIV placed without difficulty; brisk blood return noted, line flushes with ease and labs drawn. Pt tolerated well. Hgb 13.9. Pt meets parameters for phlebotomy. 500ml whole blood removed per [facility] policy and pt tolerated well. Orthostatic VS taken and pt within defined parameters to discharge home. Pt denies s/s of syncope, dizziness, lightheadedness, chest pain, and reports feels well enough to discharge home. Educated pt on when to seek provider evaluation for symptoms and pt verbalized understanding. PIV flushed, removed with tip intact and site covered with sterile gauze/coban. Next appt printed for pt and pt discharged to self care. Pt in NAD at time of discharge.

## 2024-04-29 RX ORDER — ATROPINE SULFATE 1 MG/ML
0.5 INJECTION, SOLUTION INTRAVENOUS PRN
Start: 2024-04-29

## 2024-04-29 RX ORDER — SODIUM CHLORIDE 9 MG/ML
500 INJECTION, SOLUTION INTRAVENOUS ONCE
OUTPATIENT
Start: 2024-04-29 | End: 2024-04-29

## 2024-05-22 ENCOUNTER — OUTPATIENT INFUSION SERVICES (OUTPATIENT)
Dept: ONCOLOGY | Facility: MEDICAL CENTER | Age: 64
End: 2024-05-22
Attending: INTERNAL MEDICINE
Payer: MEDICAID

## 2024-05-22 VITALS
BODY MASS INDEX: 34.92 KG/M2 | TEMPERATURE: 97.4 F | DIASTOLIC BLOOD PRESSURE: 83 MMHG | RESPIRATION RATE: 18 BRPM | HEIGHT: 61 IN | OXYGEN SATURATION: 95 % | HEART RATE: 76 BPM | WEIGHT: 184.97 LBS | SYSTOLIC BLOOD PRESSURE: 144 MMHG

## 2024-05-22 DIAGNOSIS — E83.110 HEREDITARY HEMOCHROMATOSIS (HCC): ICD-10-CM

## 2024-05-22 LAB
FERRITIN SERPL-MCNC: 38 NG/ML (ref 10–291)
HCT VFR BLD AUTO: 37.8 % (ref 37–47)
HGB BLD-MCNC: 13.3 G/DL (ref 12–16)

## 2024-05-22 RX ORDER — SODIUM CHLORIDE 9 MG/ML
500 INJECTION, SOLUTION INTRAVENOUS ONCE
OUTPATIENT
Start: 2024-05-22 | End: 2024-05-22

## 2024-05-22 RX ORDER — ATROPINE SULFATE 1 MG/ML
0.5 INJECTION, SOLUTION INTRAVENOUS PRN
Start: 2024-05-22

## 2024-05-22 NOTE — PROGRESS NOTES
Pt arrived ambulatory or Q4 week TP, denies c/o.  PIV started in RAC with good blood return, labs drawn.  Results reviewed, meets parameters for TP.  500cc blood removed over 10 minutes, then observed for 15 minutes after, VS stable, PIV removed.  Reviewed upcoming appts, pt hasn't seen Dr. Martinez in over one year, needs f/u.  Spoke to Gaye JIMENEZ and got appt scheduled for pt prior to next appt, pt will f/u with Martine as scheduled, aware that she needs to see Martine to reevaluate TP frequency.

## 2024-06-14 ENCOUNTER — APPOINTMENT (OUTPATIENT)
Dept: HEMATOLOGY ONCOLOGY | Facility: MEDICAL CENTER | Age: 64
End: 2024-06-14
Payer: MEDICAID

## 2024-06-19 ENCOUNTER — OUTPATIENT INFUSION SERVICES (OUTPATIENT)
Dept: ONCOLOGY | Facility: MEDICAL CENTER | Age: 64
End: 2024-06-19
Attending: INTERNAL MEDICINE
Payer: MEDICAID

## 2024-06-19 VITALS
OXYGEN SATURATION: 96 % | BODY MASS INDEX: 35.67 KG/M2 | HEIGHT: 61 IN | RESPIRATION RATE: 17 BRPM | DIASTOLIC BLOOD PRESSURE: 71 MMHG | WEIGHT: 188.93 LBS | TEMPERATURE: 98.1 F | HEART RATE: 89 BPM | SYSTOLIC BLOOD PRESSURE: 124 MMHG

## 2024-06-19 DIAGNOSIS — E83.110 HEREDITARY HEMOCHROMATOSIS (HCC): ICD-10-CM

## 2024-06-19 LAB
FERRITIN SERPL-MCNC: 40 NG/ML (ref 10–291)
HCT VFR BLD AUTO: 34.6 % (ref 37–47)
HGB BLD-MCNC: 11.8 G/DL (ref 12–16)

## 2024-06-19 PROCEDURE — 85018 HEMOGLOBIN: CPT

## 2024-06-19 PROCEDURE — 36415 COLL VENOUS BLD VENIPUNCTURE: CPT

## 2024-06-19 PROCEDURE — 82728 ASSAY OF FERRITIN: CPT

## 2024-06-19 PROCEDURE — 85014 HEMATOCRIT: CPT

## 2024-06-19 RX ORDER — ATROPINE SULFATE 1 MG/ML
0.5 INJECTION, SOLUTION INTRAVENOUS PRN
Start: 2024-06-19

## 2024-06-19 RX ORDER — SODIUM CHLORIDE 9 MG/ML
500 INJECTION, SOLUTION INTRAVENOUS ONCE
OUTPATIENT
Start: 2024-06-19 | End: 2024-06-19

## 2024-06-19 NOTE — PROGRESS NOTES
Patient arrived to unit for H&H with possible therapeutic phlebotomy. She denies any signs or symptoms. VSS.    20G IV established in the left AC. Labs drawn from IV. Flushed with 10 mL NS. No erythema, edema, or pain noted.    Labs reviewed. Patient's Hgb came back at 11.8. TP not indicated at this time.    Blood return noted from IV. Flushed with 10 mL NS. No erythema, edema, or pain noted. IV removed. Site covered with gauze and coban.    Patient tolerated treatment without any adverse effects. Future appointments confirmed. Patient discharged home in stable condition.

## 2024-06-20 ENCOUNTER — HOSPITAL ENCOUNTER (OUTPATIENT)
Dept: HEMATOLOGY ONCOLOGY | Facility: MEDICAL CENTER | Age: 64
End: 2024-06-20
Attending: NURSE PRACTITIONER
Payer: MEDICAID

## 2024-06-20 VITALS
HEIGHT: 61 IN | RESPIRATION RATE: 12 BRPM | TEMPERATURE: 97.6 F | DIASTOLIC BLOOD PRESSURE: 60 MMHG | BODY MASS INDEX: 35.42 KG/M2 | WEIGHT: 187.6 LBS | OXYGEN SATURATION: 96 % | HEART RATE: 75 BPM | SYSTOLIC BLOOD PRESSURE: 126 MMHG

## 2024-06-20 DIAGNOSIS — Z09 ENCOUNTER FOR HEMATOLOGY FOLLOW-UP: ICD-10-CM

## 2024-06-20 DIAGNOSIS — E83.110 HEREDITARY HEMOCHROMATOSIS (HCC): ICD-10-CM

## 2024-06-20 PROCEDURE — 99212 OFFICE O/P EST SF 10 MIN: CPT | Performed by: NURSE PRACTITIONER

## 2024-06-20 PROCEDURE — 99214 OFFICE O/P EST MOD 30 MIN: CPT | Performed by: NURSE PRACTITIONER

## 2024-06-20 ASSESSMENT — ENCOUNTER SYMPTOMS
ABDOMINAL PAIN: 1
TINGLING: 1
WHEEZING: 0
WEIGHT LOSS: 0
NAUSEA: 1
PALPITATIONS: 0
COUGH: 1
HEADACHES: 1
DIZZINESS: 0
DIARRHEA: 1
CHILLS: 0
CONSTIPATION: 0
MYALGIAS: 0
VOMITING: 0
INSOMNIA: 1
SHORTNESS OF BREATH: 1
FEVER: 0

## 2024-06-20 ASSESSMENT — PAIN SCALES - GENERAL: PAINLEVEL: 7=MODERATE-SEVERE PAIN

## 2024-06-20 ASSESSMENT — LIFESTYLE VARIABLES: SUBSTANCE_ABUSE: 1

## 2024-06-20 NOTE — PROGRESS NOTES
"Subjective     Meggan Castro is a 63 y.o. female who presents with Follow-Up (Martinez/Follow up)            HPI      HH heterozygosity for hemochromatosis H63D   Unacc    Monthly TP            Review of Systems   Constitutional:  Negative for chills, fever, malaise/fatigue (more tired recently) and weight loss.   Respiratory:  Positive for cough (r/t COPD and smoking; worse when lays down) and shortness of breath. Negative for wheezing.    Cardiovascular:  Negative for chest pain (\"felt weird yesterday\"), palpitations and leg swelling.   Gastrointestinal:  Positive for abdominal pain (cramping at times), diarrhea (most days) and nausea (this morning). Negative for constipation and vomiting.   Genitourinary:  Negative for dysuria.   Musculoskeletal:  Negative for joint pain and myalgias (cramps in legs \"bad at drinking water\").   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 Negative for dizziness.   Psychiatric/Behavioral:  Positive for substance abuse (up tpo 2 ppd, drinks ~12 pack per day). The patient has insomnia (consistent with baseline line).         Homeless/struggling [phone]; in an apartment now, roommate situation; Not getting housing assist per [employer] as she is not in their ETOH program; in touch with son locally              Objective     /60 (BP Location: Right arm, Patient Position: Sitting, BP Cuff Size: Adult)   Pulse 75   Temp 36.4 °C (97.6 °F) (Temporal)   Resp 12   Ht 1.56 m (5' 1.42\")   Wt 85.1 kg (187 lb 9.6 oz)   LMP 11/29/2012   SpO2 96%   BMI 34.96 kg/m²      Physical Exam                       Assessment & Plan     @[employer]([id number])@  [unfilled]          Coarse on left, cleared with good cough  Diminished on right      Tp every 3 months rto in 6 mo and check in     " (consistent with baseline line).         Homeless/struggling [phone]; in an apartment now, roommate situation; Not getting housing assist per [employer] as she is not in their ETOH program; in touch with son locally     Allergies   Allergen Reactions    Tape Rash     Paper tape reaction,          Current Outpatient Medications on File Prior to Encounter   Medication Sig Dispense Refill    escitalopram (LEXAPRO) 10 MG Tab TAKE 1/2 TABLET BY MOUTH EVERY DAY FOR FOUR DAYS, THEN INCREASE TO TAKE ONE TABLET BY MOUTH EVERY DAY      atorvastatin (LIPITOR) 10 MG Tab       lisinopril (PRINIVIL) 20 MG Tab Take 1 Tablet by mouth every day. 30 Tablet 4    ibuprofen (MOTRIN) 400 MG TABS Take 1 Tab by mouth every 8 hours as needed for Mild Pain. 30 Tab 0    cefUROXime (CEFTIN) 250 MG Tab TAKE ONE TABLET BY MOUTH EVERY TWELVE HOURS FOR 7 DAYS (Patient not taking: Reported on 10/11/2023)      traZODone (DESYREL) 50 MG Tab TAKE ONE TABLET BY MOUTH AT BEDTIME ONCE DAILY AS NEEDED (Patient not taking: Reported on 6/20/2024)      naltrexone (DEPADE) 50 MG Tab Take 50 mg by mouth every day. (Patient not taking: Reported on 6/20/2024)      metroNIDAZOLE (FLAGYL) 500 MG Tab Take 500 mg by mouth 2 times a day. FOR 7 DAYS (Patient not taking: Reported on 8/16/2023)      triamcinolone acetonide (KENALOG) 0.1 % Ointment  (Patient not taking: Reported on 6/20/2024)      hydrOXYzine pamoate (VISTARIL) 25 MG Cap  (Patient not taking: Reported on 6/20/2024)      benzonatate (TESSALON) 100 MG Cap Take 2 Capsules by mouth 3 times a day as needed for Cough. (Patient not taking: Reported on 6/20/2024) 20 Capsule 0    silver sulfADIAZINE (SILVADENE) 1 % Cream Apply to the affected area (Patient not taking: Reported on 6/20/2024) 1 Each 3    hydrocortisone 2.5 % CREA Apply 1 Application to affected area(s) 2 times a day. Apply to affected area(s) 2 times a day. (Patient not taking: Reported on 6/20/2024) 15 g 0    ergocalciferol (DRISDOL) 09357 UNIT  "capsule Take 1 Cap by mouth every Monday. (Patient not taking: Reported on 8/16/2023) 4 Cap 1    clotrimazole (LOTRIMIN) 1 % CREA Apply Bid in between toes x 2 weeks (Patient not taking: Reported on 10/11/2023) 1 Tube 1     No current facility-administered medications on file prior to encounter.              Objective     /60 (BP Location: Right arm, Patient Position: Sitting, BP Cuff Size: Adult)   Pulse 75   Temp 36.4 °C (97.6 °F) (Temporal)   Resp 12   Ht 1.56 m (5' 1.42\")   Wt 85.1 kg (187 lb 9.6 oz)   LMP 11/29/2012   SpO2 96%   BMI 34.96 kg/m²      Physical Exam  Vitals reviewed.   Constitutional:       General: She is not in acute distress.     Appearance: She is well-developed. She is not diaphoretic.   HENT:      Head: Normocephalic and atraumatic.      Mouth/Throat:      Pharynx: No oropharyngeal exudate.   Eyes:      General: No scleral icterus.        Right eye: No discharge.         Left eye: No discharge.      Conjunctiva/sclera: Conjunctivae normal.      Pupils: Pupils are equal, round, and reactive to light.   Cardiovascular:      Rate and Rhythm: Normal rate and regular rhythm.      Heart sounds: Normal heart sounds. No murmur heard.     No friction rub. No gallop.   Pulmonary:      Effort: Pulmonary effort is normal. No respiratory distress.      Breath sounds: Examination of the right-upper field reveals decreased breath sounds. Examination of the right-middle field reveals decreased breath sounds. Examination of the right-lower field reveals decreased breath sounds. Decreased breath sounds present. No wheezing.      Comments: Coarse of left, cleared with good cough  Abdominal:      General: Bowel sounds are normal. There is no distension.      Palpations: Abdomen is soft. There is no hepatomegaly or splenomegaly.      Tenderness: There is no abdominal tenderness.   Musculoskeletal:         General: Normal range of motion.      Cervical back: Normal range of motion and neck supple. "   Skin:     General: Skin is warm and dry.      Coloration: Skin is not pale.      Findings: No erythema or rash.   Neurological:      Mental Status: She is alert and oriented to person, place, and time.   Psychiatric:         Mood and Affect: Mood normal.         Behavior: Behavior normal.                            Assessment & Plan     1. Hereditary hemochromatosis (HCC)        2. Encounter for hematology follow-up            1. HH: Diagnosed 2/2023, initiated monthly TP    CBC, TIBC, ferritin are noticeable improved since initiating TP lat year. We will change procedure to every 3 months and she will return in 6 months for re-evaluation, sooner as needed.      The patient verbalized agreement and understanding of current plan. All questions and concerns were addressed at time of visit.    Please note that this dictation was created using voice recognition software. I have made every reasonable attempt to correct obvious errors, but I expect that there are errors of grammar and possibly content that I did not discover before finalizing the note.

## 2024-07-05 RX ORDER — LISINOPRIL AND HYDROCHLOROTHIAZIDE 20; 12.5 MG/1; MG/1
1 TABLET ORAL DAILY
COMMUNITY
Start: 2024-06-04

## 2024-07-17 ENCOUNTER — APPOINTMENT (OUTPATIENT)
Dept: ONCOLOGY | Facility: MEDICAL CENTER | Age: 64
End: 2024-07-17
Attending: INTERNAL MEDICINE
Payer: MEDICAID

## 2024-08-14 ENCOUNTER — OUTPATIENT INFUSION SERVICES (OUTPATIENT)
Dept: ONCOLOGY | Facility: MEDICAL CENTER | Age: 64
End: 2024-08-14
Attending: INTERNAL MEDICINE
Payer: MEDICAID

## 2024-08-14 VITALS
HEIGHT: 61 IN | DIASTOLIC BLOOD PRESSURE: 85 MMHG | HEART RATE: 94 BPM | WEIGHT: 185.41 LBS | TEMPERATURE: 97.5 F | OXYGEN SATURATION: 95 % | BODY MASS INDEX: 35.01 KG/M2 | RESPIRATION RATE: 18 BRPM | SYSTOLIC BLOOD PRESSURE: 123 MMHG

## 2024-08-14 DIAGNOSIS — E83.110 HEREDITARY HEMOCHROMATOSIS (HCC): ICD-10-CM

## 2024-08-14 LAB
FERRITIN SERPL-MCNC: 33 NG/ML (ref 10–291)
HCT VFR BLD AUTO: 37.2 % (ref 37–47)
HGB BLD-MCNC: 13.1 G/DL (ref 12–16)

## 2024-08-14 PROCEDURE — 82728 ASSAY OF FERRITIN: CPT

## 2024-08-14 PROCEDURE — 85014 HEMATOCRIT: CPT

## 2024-08-14 PROCEDURE — 99195 PHLEBOTOMY: CPT

## 2024-08-14 PROCEDURE — 85018 HEMOGLOBIN: CPT

## 2024-08-14 RX ORDER — SODIUM CHLORIDE 9 MG/ML
500 INJECTION, SOLUTION INTRAVENOUS ONCE
OUTPATIENT
Start: 2024-08-14 | End: 2024-08-14

## 2024-08-14 RX ORDER — ATROPINE SULFATE 1 MG/ML
0.5 INJECTION, SOLUTION INTRAVENOUS PRN
Start: 2024-08-14

## 2024-08-14 NOTE — PROGRESS NOTES
Meggan presented to [facility] for therapeutic phlebotomy. Meggan reported no new concerns today. PIV started to LAC, brisk blood return observed and flushed easily. Labs drawn as ordered. Hgb = 13.1 and Hct = 37.2%. Pt meets parameters for treatment today. 500 cc whole blood phlebotomized per order, pt tolerated well. Orthostatic blood pressures taken and pt WNL. PIV flushed and removed, gauze and coban dressing placed. Pt has future appointments. Pt discharged to home in good condition.

## 2024-10-08 ENCOUNTER — HOSPITAL ENCOUNTER (OUTPATIENT)
Dept: RADIOLOGY | Facility: MEDICAL CENTER | Age: 64
End: 2024-10-08
Attending: STUDENT IN AN ORGANIZED HEALTH CARE EDUCATION/TRAINING PROGRAM
Payer: MEDICAID

## 2024-10-08 DIAGNOSIS — Z12.31 BREAST CANCER SCREENING BY MAMMOGRAM: ICD-10-CM

## 2024-11-06 ENCOUNTER — OUTPATIENT INFUSION SERVICES (OUTPATIENT)
Dept: ONCOLOGY | Facility: MEDICAL CENTER | Age: 64
End: 2024-11-06
Attending: INTERNAL MEDICINE
Payer: MEDICAID

## 2024-11-06 VITALS
HEIGHT: 61 IN | DIASTOLIC BLOOD PRESSURE: 84 MMHG | SYSTOLIC BLOOD PRESSURE: 109 MMHG | BODY MASS INDEX: 35.8 KG/M2 | WEIGHT: 189.6 LBS | OXYGEN SATURATION: 97 % | RESPIRATION RATE: 18 BRPM | HEART RATE: 92 BPM | TEMPERATURE: 98 F

## 2024-11-06 DIAGNOSIS — E83.110 HEREDITARY HEMOCHROMATOSIS (HCC): ICD-10-CM

## 2024-11-06 LAB
FERRITIN SERPL-MCNC: 35.6 NG/ML (ref 10–291)
HCT VFR BLD AUTO: 33.4 % (ref 37–47)
HGB BLD-MCNC: 11.2 G/DL (ref 12–16)

## 2024-11-06 PROCEDURE — 36415 COLL VENOUS BLD VENIPUNCTURE: CPT

## 2024-11-06 PROCEDURE — 82728 ASSAY OF FERRITIN: CPT

## 2024-11-06 PROCEDURE — 85014 HEMATOCRIT: CPT

## 2024-11-06 PROCEDURE — 85018 HEMOGLOBIN: CPT

## 2024-11-06 RX ORDER — SODIUM CHLORIDE 9 MG/ML
500 INJECTION, SOLUTION INTRAVENOUS ONCE
OUTPATIENT
Start: 2024-11-06 | End: 2024-11-06

## 2024-11-06 RX ORDER — ATROPINE SULFATE 1 MG/ML
0.5 INJECTION, SOLUTION INTRAVENOUS PRN
Start: 2024-11-06

## 2024-11-06 ASSESSMENT — PAIN DESCRIPTION - PAIN TYPE: TYPE: ACUTE PAIN

## 2024-11-06 NOTE — PROGRESS NOTES
Patient arrives for the therapeutic phlebotomy. Patient does not report any changes from last visit. PIV was obtained on the RAC, 22G, 1 attempt.  Labs drawn as ordered by MD.  Patient's Hgb 11.2 , Hct 33.4 %, patient not within parameters. PIV was removed, site covered with coban and gauze. Next appointment scheduled. Patient left [facility] and no signs of distress.

## 2024-11-19 ENCOUNTER — HOSPITAL ENCOUNTER (OUTPATIENT)
Dept: RADIOLOGY | Facility: MEDICAL CENTER | Age: 64
End: 2024-11-19
Attending: STUDENT IN AN ORGANIZED HEALTH CARE EDUCATION/TRAINING PROGRAM
Payer: MEDICAID

## 2024-11-19 PROCEDURE — 77067 SCR MAMMO BI INCL CAD: CPT

## 2024-12-06 ENCOUNTER — TELEPHONE (OUTPATIENT)
Dept: HEMATOLOGY ONCOLOGY | Facility: MEDICAL CENTER | Age: 64
End: 2024-12-06
Payer: MEDICAID

## 2024-12-06 NOTE — TELEPHONE ENCOUNTER
I called and LVM for patient to call the office to reschedule her missed visit (6 month follow up) with DALJIT Michele, that was scheduled for today at 11:30 am.

## 2024-12-18 ENCOUNTER — APPOINTMENT (OUTPATIENT)
Dept: HEMATOLOGY ONCOLOGY | Facility: MEDICAL CENTER | Age: 64
End: 2024-12-18
Payer: MEDICAID

## 2025-01-29 ENCOUNTER — OUTPATIENT INFUSION SERVICES (OUTPATIENT)
Dept: ONCOLOGY | Facility: MEDICAL CENTER | Age: 65
End: 2025-01-29
Attending: INTERNAL MEDICINE
Payer: MEDICAID

## 2025-01-29 VITALS
RESPIRATION RATE: 18 BRPM | DIASTOLIC BLOOD PRESSURE: 67 MMHG | HEIGHT: 61 IN | WEIGHT: 189.15 LBS | OXYGEN SATURATION: 93 % | BODY MASS INDEX: 35.71 KG/M2 | HEART RATE: 99 BPM | SYSTOLIC BLOOD PRESSURE: 128 MMHG | TEMPERATURE: 98.2 F

## 2025-01-29 DIAGNOSIS — E83.110 HEREDITARY HEMOCHROMATOSIS (HCC): ICD-10-CM

## 2025-01-29 LAB
FERRITIN SERPL-MCNC: 37.2 NG/ML (ref 10–291)
HCT VFR BLD AUTO: 32.7 % (ref 37–47)
HGB BLD-MCNC: 10.8 G/DL (ref 12–16)

## 2025-01-29 PROCEDURE — 85014 HEMATOCRIT: CPT

## 2025-01-29 PROCEDURE — 85018 HEMOGLOBIN: CPT

## 2025-01-29 PROCEDURE — 82728 ASSAY OF FERRITIN: CPT

## 2025-01-29 PROCEDURE — 36415 COLL VENOUS BLD VENIPUNCTURE: CPT

## 2025-01-29 RX ORDER — SODIUM CHLORIDE 9 MG/ML
500 INJECTION, SOLUTION INTRAVENOUS ONCE
OUTPATIENT
Start: 2025-01-29 | End: 2025-01-29

## 2025-01-29 RX ORDER — ATROPINE SULFATE 1 MG/ML
0.5 INJECTION, SOLUTION INTRAVENOUS PRN
Start: 2025-01-29

## 2025-01-29 ASSESSMENT — PAIN DESCRIPTION - PAIN TYPE: TYPE: CHRONIC PAIN

## 2025-01-29 NOTE — PROGRESS NOTES
Pt arrived ambulatory to [facility] for  Q 12 week H/H possible TP for hemochromatosis. POC discussed with pt and she agrees with plan.     PIV established, brisk blood return noted. Labs drawn as ordered. Results reviewed, Hgb  10.8 today. Pt does NOT meet parameters for TP.     PIV dc'd catheter tip intact, gauze and coban dressing applied.     Pt discharged to self care, [facility]. Pt's next appointment confirmed 4/23/2025.

## 2025-02-05 ENCOUNTER — HOSPITAL ENCOUNTER (OUTPATIENT)
Dept: HEMATOLOGY ONCOLOGY | Facility: MEDICAL CENTER | Age: 65
End: 2025-02-05
Attending: PHYSICIAN ASSISTANT
Payer: MEDICAID

## 2025-02-05 VITALS
SYSTOLIC BLOOD PRESSURE: 120 MMHG | WEIGHT: 186.29 LBS | HEART RATE: 90 BPM | RESPIRATION RATE: 17 BRPM | HEIGHT: 61 IN | DIASTOLIC BLOOD PRESSURE: 60 MMHG | OXYGEN SATURATION: 97 % | BODY MASS INDEX: 35.17 KG/M2 | TEMPERATURE: 97.3 F

## 2025-02-05 DIAGNOSIS — F10.10 ALCOHOL ABUSE: ICD-10-CM

## 2025-02-05 DIAGNOSIS — Z09 ENCOUNTER FOR HEMATOLOGY FOLLOW-UP: ICD-10-CM

## 2025-02-05 DIAGNOSIS — E83.110 HEREDITARY HEMOCHROMATOSIS (HCC): ICD-10-CM

## 2025-02-05 DIAGNOSIS — F32.A DEPRESSION, UNSPECIFIED DEPRESSION TYPE: ICD-10-CM

## 2025-02-05 PROCEDURE — 99214 OFFICE O/P EST MOD 30 MIN: CPT | Performed by: PHYSICIAN ASSISTANT

## 2025-02-05 PROCEDURE — 99212 OFFICE O/P EST SF 10 MIN: CPT | Performed by: PHYSICIAN ASSISTANT

## 2025-02-05 ASSESSMENT — ENCOUNTER SYMPTOMS
HEMOPTYSIS: 0
BLOOD IN STOOL: 0
PALPITATIONS: 0
DEPRESSION: 1
ABDOMINAL PAIN: 0
NAUSEA: 0
FEVER: 0
DIARRHEA: 0
BLURRED VISION: 0
CONSTIPATION: 0
COUGH: 0
SHORTNESS OF BREATH: 1
DIAPHORESIS: 0
CHILLS: 0
VOMITING: 0
HEARTBURN: 0
WEIGHT LOSS: 0

## 2025-02-05 ASSESSMENT — PAIN SCALES - GENERAL: PAINLEVEL_OUTOF10: 3=SLIGHT PAIN

## 2025-02-05 NOTE — PROGRESS NOTES
Follow Up Note:  Hematology/Oncology    Current Diagnosis : hereditary hemochromatosis: Heterozygous H63D   Date of Diagnosis: Jan 2023    Chief Complaint: Patient seen today for evaluation and ongoing management of hemachromatosis.     Care Team:   Anais Webb P.A.-C.     Oncology History of Presenting Illness: per DALJIT Soriano:  Patient was referred per PCP in 1/2023 for further evaluation of elevated ferritin and correlation with heterozygous hemochromatosis. Patient was advised to decrease amount of EtOH and initiate monthly therapeutic phlebotomy. She was transitioned to q3mo TP in June 2024.   6/20/24: Patient is living with her roommate situation at present, she has history of meth use and homelessness.  She continues working with [employer] and is encouraged to answer her phone for nonspinning number so that she can better utilize the services.  She reports intermittent abdominal pain, cramping, with diarrhea most days.  She had nausea this morning that improved following meal.  EtOH use continues daily, provided by her roommate.  In regard to hemochromatosis patient is essentially asymptomatic and at her baseline.     Current Treatment: q3mo therapeutic phlebotomy (H/H, ferritin prior. Parameters: hold for Hgb under 12)  Treatment History (most recent):  8/14/24: TP completed  11/6/24: parameters NOT met (Hgb 11.2)  1/29/25: parameters NOT met (Hgb 10.8)    Interval History Angelica BARAJAS:   Meggan returns to clinic today for f/up. She has attempted phlebotomy q3mo as instructed, but has not met parameters recently. She reports chronic fatigue, which seems to be her baseline. Her main complaints today revolve around her psychosocial stressors, including her son's recent TBI, ongoing depression. She admits to smoking and drinking more beer lately. She tells me that she sees her PCP and/or psychiatrist through [facility] every month & is working on establishing with a new therapist. She denies thoughts of suicide  or harming herself/others. She is on lexapro.     Allergies as of 02/05/2025 - Reviewed 01/29/2025   Allergen Reaction Noted    Tape Rash 12/02/2012       Current Outpatient Medications:     lisinopril-hydrochlorothiazide (PRINZIDE) 20-12.5 MG per tablet, Take 1 Tablet by mouth every day., Disp: , Rfl:     cefUROXime (CEFTIN) 250 MG Tab, TAKE ONE TABLET BY MOUTH EVERY TWELVE HOURS FOR 7 DAYS (Patient not taking: Reported on 10/11/2023), Disp: , Rfl:     escitalopram (LEXAPRO) 10 MG Tab, TAKE 1/2 TABLET BY MOUTH EVERY DAY FOR FOUR DAYS, THEN INCREASE TO TAKE ONE TABLET BY MOUTH EVERY DAY, Disp: , Rfl:     traZODone (DESYREL) 50 MG Tab, TAKE ONE TABLET BY MOUTH AT BEDTIME ONCE DAILY AS NEEDED (Patient not taking: Reported on 6/20/2024), Disp: , Rfl:     naltrexone (DEPADE) 50 MG Tab, Take 50 mg by mouth every day. (Patient not taking: Reported on 6/20/2024), Disp: , Rfl:     atorvastatin (LIPITOR) 10 MG Tab, , Disp: , Rfl:     metroNIDAZOLE (FLAGYL) 500 MG Tab, Take 500 mg by mouth 2 times a day. FOR 7 DAYS (Patient not taking: Reported on 8/16/2023), Disp: , Rfl:     triamcinolone acetonide (KENALOG) 0.1 % Ointment, , Disp: , Rfl:     hydrOXYzine pamoate (VISTARIL) 25 MG Cap, , Disp: , Rfl:     benzonatate (TESSALON) 100 MG Cap, Take 2 Capsules by mouth 3 times a day as needed for Cough. (Patient not taking: Reported on 6/20/2024), Disp: 20 Capsule, Rfl: 0    silver sulfADIAZINE (SILVADENE) 1 % Cream, Apply to the affected area (Patient not taking: Reported on 6/20/2024), Disp: 1 Each, Rfl: 3    hydrocortisone 2.5 % CREA, Apply 1 Application to affected area(s) 2 times a day. Apply to affected area(s) 2 times a day. (Patient not taking: Reported on 6/20/2024), Disp: 15 g, Rfl: 0    ergocalciferol (DRISDOL) 21132 UNIT capsule, Take 1 Cap by mouth every Monday. (Patient not taking: Reported on 8/16/2023), Disp: 4 Cap, Rfl: 1    clotrimazole (LOTRIMIN) 1 % CREA, Apply Bid in between toes x 2 weeks (Patient not taking:  "Reported on 10/11/2023), Disp: 1 Tube, Rfl: 1    ibuprofen (MOTRIN) 400 MG TABS, Take 1 Tab by mouth every 8 hours as needed for Mild Pain., Disp: 30 Tab, Rfl: 0  Review of Systems:  Review of Systems   Constitutional:  Positive for malaise/fatigue. Negative for chills, diaphoresis, fever and weight loss.   HENT:  Negative for hearing loss.    Eyes:  Negative for blurred vision.   Respiratory:  Positive for shortness of breath (at baseline, uses inhaler). Negative for cough and hemoptysis.    Cardiovascular:  Negative for chest pain and palpitations.   Gastrointestinal:  Negative for abdominal pain, blood in stool, constipation, diarrhea, heartburn, nausea and vomiting.   Genitourinary:  Negative for hematuria.   Skin:  Negative for rash.   Psychiatric/Behavioral:  Positive for depression. Negative for suicidal ideas.      Physical Exam:  Vitals:    02/05/25 1123   Temp: 36.3 °C (97.3 °F)   TempSrc: Temporal   Weight: 84.5 kg (186 lb 4.6 oz)   Height: 1.56 m (5' 1.42\")     Physical Exam  Constitutional:       Comments: Pt is tearful at times but interacts appropriately   HENT:      Head: Normocephalic and atraumatic.   Eyes:      Conjunctiva/sclera: Conjunctivae normal.   Cardiovascular:      Rate and Rhythm: Normal rate and regular rhythm.      Heart sounds: Normal heart sounds.   Pulmonary:      Effort: Pulmonary effort is normal.      Breath sounds: Normal breath sounds.   Abdominal:      General: Abdomen is flat. Bowel sounds are normal.      Palpations: Abdomen is soft.      Tenderness: There is no abdominal tenderness.   Skin:     General: Skin is warm and dry.   Neurological:      General: No focal deficit present.      Mental Status: She is alert.   Psychiatric:         Mood and Affect: Mood normal.         Speech: Speech is tangential (occasional during visit).         Behavior: Behavior normal.         Thought Content: Thought content is paranoid (expressed distrust of technology, \"if the number doesn't " "show up as [facility] or [facility] I won't pick it up!\"). Thought content does not include suicidal plan.       Labs: reviewed with pt today   Latest Reference Range & Units 06/19/24 11:45 08/14/24 11:36 11/06/24 11:30 01/29/25 11:44   Hemoglobin 12.0 - 16.0 g/dL 11.8 (L) 13.1 11.2 (L) 10.8 (L)   Hematocrit 37.0 - 47.0 % 34.6 (L) 37.2 33.4 (L) 32.7 (L)   Ferritin 10.0 - 291.0 ng/mL 40.0 33.0 35.6 37.2       Assessment & Plan:  1. Hereditary hemochromatosis (HCC)  HEMOGLOBIN AND HEMATOCRIT    FERRITIN      2. Encounter for hematology follow-up  HEMOGLOBIN AND HEMATOCRIT    FERRITIN      3. ETOH abuse        4. Depression, unspecified depression type          Hereditary Hemachromatosis - heterozygous H63D. Dx Jan 2023, initiated monthly therapeutic phlebotomy transitioned to q3mo TP in June 2024.   Has not met parameters for TP since Aug 2024  -Discontinue phlebotomy & recheck H/H & ferritin every 6 months    I counseled Meggan on the adverse effects of alcohol & tobacco use on her condition & she verbalized understanding. Pt is in agreement with f/up with PCP, psychiatry & therapist for ongoing support.     Return for Follow Up: 6mo with labs      Any questions and concerns raised by the patient were answered to the best of my ability. Thank you for allowing me to participate in the care for this patient. Please feel free to contact me for any questions or concerns.   Total time spent on chart review, clinic encounter, documentation, coordination of care: 30 minutes.   Please note that this dictation was created using voice recognition software. I have made every reasonable attempt to correct obvious errors, but I expect that there are errors of grammar and possibly content that I did not discover before finalizing the note.   "

## 2025-04-23 ENCOUNTER — OUTPATIENT INFUSION SERVICES (OUTPATIENT)
Dept: ONCOLOGY | Facility: MEDICAL CENTER | Age: 65
End: 2025-04-23
Attending: INTERNAL MEDICINE
Payer: MEDICAID

## 2025-04-23 NOTE — PROGRESS NOTES
Pt scheduled today for TP, noted plan was put on hold by provider on 2/5/25.  Called pt, reinforced that appt today is cancelled, she will f/u as scheduled in clinic in August.

## 2025-08-05 ENCOUNTER — HOSPITAL ENCOUNTER (OUTPATIENT)
Dept: HEMATOLOGY ONCOLOGY | Facility: MEDICAL CENTER | Age: 65
End: 2025-08-05
Attending: PHYSICIAN ASSISTANT
Payer: MEDICAID

## 2025-08-05 VITALS
DIASTOLIC BLOOD PRESSURE: 70 MMHG | RESPIRATION RATE: 22 BRPM | SYSTOLIC BLOOD PRESSURE: 132 MMHG | HEIGHT: 61 IN | OXYGEN SATURATION: 94 % | HEART RATE: 106 BPM | BODY MASS INDEX: 37.76 KG/M2 | WEIGHT: 200 LBS

## 2025-08-05 DIAGNOSIS — E83.110 HEREDITARY HEMOCHROMATOSIS (HCC): Primary | ICD-10-CM

## 2025-08-05 DIAGNOSIS — Z09 ENCOUNTER FOR HEMATOLOGY FOLLOW-UP: ICD-10-CM

## 2025-08-05 DIAGNOSIS — Z59.41 FOOD INSECURITY: ICD-10-CM

## 2025-08-05 DIAGNOSIS — Z60.9 SOCIAL PROBLEM: ICD-10-CM

## 2025-08-05 DIAGNOSIS — F10.10 ALCOHOL ABUSE: ICD-10-CM

## 2025-08-05 PROCEDURE — 99212 OFFICE O/P EST SF 10 MIN: CPT | Performed by: PHYSICIAN ASSISTANT

## 2025-08-05 PROCEDURE — 99213 OFFICE O/P EST LOW 20 MIN: CPT

## 2025-08-05 PROCEDURE — 99214 OFFICE O/P EST MOD 30 MIN: CPT | Performed by: PHYSICIAN ASSISTANT

## 2025-08-05 SDOH — ECONOMIC STABILITY - FOOD INSECURITY: FOOD INSECURITY: Z59.41

## 2025-08-05 SDOH — SOCIAL STABILITY - SOCIAL INSECURITY: PROBLEM RELATED TO SOCIAL ENVIRONMENT, UNSPECIFIED: Z60.9

## 2025-08-05 ASSESSMENT — ENCOUNTER SYMPTOMS
BLOOD IN STOOL: 0
ABDOMINAL PAIN: 0
BLURRED VISION: 0
DIAPHORESIS: 0
CONSTIPATION: 0
HEMOPTYSIS: 0
WEIGHT LOSS: 0
PALPITATIONS: 0
FEVER: 0
CHILLS: 0
SHORTNESS OF BREATH: 0
HEARTBURN: 0
INSOMNIA: 1
NAUSEA: 0
DEPRESSION: 1
BRUISES/BLEEDS EASILY: 0
HEADACHES: 0
VOMITING: 0
NERVOUS/ANXIOUS: 1
COUGH: 0
DIARRHEA: 0

## 2025-08-05 ASSESSMENT — LIFESTYLE VARIABLES: SUBSTANCE_ABUSE: 1

## 2025-08-06 ENCOUNTER — APPOINTMENT (OUTPATIENT)
Dept: RADIOLOGY | Facility: MEDICAL CENTER | Age: 65
End: 2025-08-06
Attending: EMERGENCY MEDICINE
Payer: MEDICAID

## 2025-08-06 ENCOUNTER — HOSPITAL ENCOUNTER (EMERGENCY)
Facility: MEDICAL CENTER | Age: 65
End: 2025-08-06
Attending: EMERGENCY MEDICINE
Payer: MEDICAID

## 2025-08-06 VITALS
TEMPERATURE: 97 F | DIASTOLIC BLOOD PRESSURE: 53 MMHG | HEART RATE: 82 BPM | HEIGHT: 61 IN | OXYGEN SATURATION: 96 % | RESPIRATION RATE: 20 BRPM | SYSTOLIC BLOOD PRESSURE: 96 MMHG | BODY MASS INDEX: 37.28 KG/M2

## 2025-08-06 DIAGNOSIS — E87.6 HYPOKALEMIA: ICD-10-CM

## 2025-08-06 DIAGNOSIS — K70.31 ASCITES DUE TO ALCOHOLIC CIRRHOSIS (HCC): Primary | ICD-10-CM

## 2025-08-06 DIAGNOSIS — F10.20 ALCOHOLISM (HCC): ICD-10-CM

## 2025-08-06 LAB
ALBUMIN FLD-MCNC: 0.9 G/DL
ALBUMIN SERPL BCP-MCNC: 2.4 G/DL (ref 3.2–4.9)
ALBUMIN/GLOB SERPL: 0.5 G/DL
ALP SERPL-CCNC: 64 U/L (ref 30–99)
ALT SERPL-CCNC: 12 U/L (ref 2–50)
AMYLASE FLD-CCNC: 33 U/L
ANION GAP SERPL CALC-SCNC: 13 MMOL/L (ref 7–16)
APPEARANCE FLD: CLEAR
AST SERPL-CCNC: 32 U/L (ref 12–45)
BASOPHILS # BLD AUTO: 0.5 % (ref 0–1.8)
BASOPHILS # BLD: 0.03 K/UL (ref 0–0.12)
BILIRUB SERPL-MCNC: 4.5 MG/DL (ref 0.1–1.5)
BODY FLD TYPE: NORMAL
BODY FLD TYPE: NORMAL
BUN SERPL-MCNC: 6 MG/DL (ref 8–22)
CALCIUM ALBUM COR SERPL-MCNC: 9.6 MG/DL (ref 8.5–10.5)
CALCIUM SERPL-MCNC: 8.3 MG/DL (ref 8.5–10.5)
CHLORIDE SERPL-SCNC: 94 MMOL/L (ref 96–112)
CO2 SERPL-SCNC: 20 MMOL/L (ref 20–33)
COLOR FLD: YELLOW
CREAT SERPL-MCNC: 0.59 MG/DL (ref 0.5–1.4)
EKG IMPRESSION: NORMAL
EOSINOPHIL # BLD AUTO: 0.05 K/UL (ref 0–0.51)
EOSINOPHIL NFR BLD: 0.9 % (ref 0–6.9)
ERYTHROCYTE [DISTWIDTH] IN BLOOD BY AUTOMATED COUNT: 61 FL (ref 35.9–50)
GFR SERPLBLD CREATININE-BSD FMLA CKD-EPI: 101 ML/MIN/1.73 M 2
GLOBULIN SER CALC-MCNC: 5.3 G/DL (ref 1.9–3.5)
GLUCOSE FLD-MCNC: 138 MG/DL
GLUCOSE SERPL-MCNC: 131 MG/DL (ref 65–99)
GRAM STN SPEC: NORMAL
HCT VFR BLD AUTO: 31.3 % (ref 37–47)
HGB BLD-MCNC: 10.5 G/DL (ref 12–16)
IMM GRANULOCYTES # BLD AUTO: 0.04 K/UL (ref 0–0.11)
IMM GRANULOCYTES NFR BLD AUTO: 0.7 % (ref 0–0.9)
LIPASE SERPL-CCNC: 59 U/L (ref 11–82)
LYMPHOCYTES # BLD AUTO: 1.28 K/UL (ref 1–4.8)
LYMPHOCYTES NFR BLD: 23 % (ref 22–41)
LYMPHOCYTES NFR FLD: 53 %
MCH RBC QN AUTO: 34.5 PG (ref 27–33)
MCHC RBC AUTO-ENTMCNC: 33.5 G/DL (ref 32.2–35.5)
MCV RBC AUTO: 103 FL (ref 81.4–97.8)
MONOCYTES # BLD AUTO: 0.61 K/UL (ref 0–0.85)
MONOCYTES NFR BLD AUTO: 11 % (ref 0–13.4)
MONOS+MACROS NFR FLD MANUAL: 29 %
NEUTROPHILS # BLD AUTO: 3.55 K/UL (ref 1.82–7.42)
NEUTROPHILS NFR BLD: 63.9 % (ref 44–72)
NEUTROPHILS NFR FLD: 18 %
NRBC # BLD AUTO: 0 K/UL
NRBC BLD-RTO: 0 /100 WBC (ref 0–0.2)
NUC CELL # FLD: 149 CELLS/UL
PLATELET # BLD AUTO: 109 K/UL (ref 164–446)
PMV BLD AUTO: 10.1 FL (ref 9–12.9)
POTASSIUM SERPL-SCNC: 2.9 MMOL/L (ref 3.6–5.5)
PROT SERPL-MCNC: 7.7 G/DL (ref 6–8.2)
RBC # BLD AUTO: 3.04 M/UL (ref 4.2–5.4)
RBC # FLD: <2000 CELLS/UL
SIGNIFICANT IND 70042: NORMAL
SITE SITE: NORMAL
SODIUM SERPL-SCNC: 127 MMOL/L (ref 135–145)
SOURCE SOURCE: NORMAL
TROPONIN T SERPL-MCNC: 11 NG/L (ref 6–19)
WBC # BLD AUTO: 5.6 K/UL (ref 4.8–10.8)

## 2025-08-06 PROCEDURE — 82247 BILIRUBIN TOTAL: CPT

## 2025-08-06 PROCEDURE — 307738 PARACENTESIS ABDOMINAL KIT: Performed by: EMERGENCY MEDICINE

## 2025-08-06 PROCEDURE — 80053 COMPREHEN METABOLIC PANEL: CPT

## 2025-08-06 PROCEDURE — 82150 ASSAY OF AMYLASE: CPT

## 2025-08-06 PROCEDURE — A9270 NON-COVERED ITEM OR SERVICE: HCPCS | Mod: UD | Performed by: EMERGENCY MEDICINE

## 2025-08-06 PROCEDURE — 93005 ELECTROCARDIOGRAM TRACING: CPT | Mod: TC

## 2025-08-06 PROCEDURE — 89051 BODY FLUID CELL COUNT: CPT

## 2025-08-06 PROCEDURE — C1729 CATH, DRAINAGE: HCPCS | Performed by: EMERGENCY MEDICINE

## 2025-08-06 PROCEDURE — 93005 ELECTROCARDIOGRAM TRACING: CPT | Mod: TC | Performed by: EMERGENCY MEDICINE

## 2025-08-06 PROCEDURE — 84484 ASSAY OF TROPONIN QUANT: CPT

## 2025-08-06 PROCEDURE — 96367 TX/PROPH/DG ADDL SEQ IV INF: CPT | Mod: XU

## 2025-08-06 PROCEDURE — 87015 SPECIMEN INFECT AGNT CONCNTJ: CPT

## 2025-08-06 PROCEDURE — 96365 THER/PROPH/DIAG IV INF INIT: CPT | Mod: XU

## 2025-08-06 PROCEDURE — 71045 X-RAY EXAM CHEST 1 VIEW: CPT

## 2025-08-06 PROCEDURE — 87070 CULTURE OTHR SPECIMN AEROBIC: CPT

## 2025-08-06 PROCEDURE — 85025 COMPLETE CBC W/AUTO DIFF WBC: CPT

## 2025-08-06 PROCEDURE — 82042 OTHER SOURCE ALBUMIN QUAN EA: CPT

## 2025-08-06 PROCEDURE — 36415 COLL VENOUS BLD VENIPUNCTURE: CPT

## 2025-08-06 PROCEDURE — 96375 TX/PRO/DX INJ NEW DRUG ADDON: CPT | Mod: XU

## 2025-08-06 PROCEDURE — 700111 HCHG RX REV CODE 636 W/ 250 OVERRIDE (IP): Mod: UD | Performed by: EMERGENCY MEDICINE

## 2025-08-06 PROCEDURE — 83690 ASSAY OF LIPASE: CPT

## 2025-08-06 PROCEDURE — 87205 SMEAR GRAM STAIN: CPT

## 2025-08-06 PROCEDURE — 99285 EMERGENCY DEPT VISIT HI MDM: CPT

## 2025-08-06 PROCEDURE — 700102 HCHG RX REV CODE 250 W/ 637 OVERRIDE(OP): Mod: UD | Performed by: EMERGENCY MEDICINE

## 2025-08-06 PROCEDURE — 700101 HCHG RX REV CODE 250: Performed by: EMERGENCY MEDICINE

## 2025-08-06 PROCEDURE — 49082 ABD PARACENTESIS: CPT

## 2025-08-06 PROCEDURE — 82945 GLUCOSE OTHER FLUID: CPT

## 2025-08-06 PROCEDURE — 96366 THER/PROPH/DIAG IV INF ADDON: CPT | Mod: XU

## 2025-08-06 RX ORDER — POTASSIUM CHLORIDE 7.45 MG/ML
10 INJECTION INTRAVENOUS ONCE
Status: COMPLETED | OUTPATIENT
Start: 2025-08-06 | End: 2025-08-06

## 2025-08-06 RX ORDER — POTASSIUM CHLORIDE 1500 MG/1
20 TABLET, EXTENDED RELEASE ORAL 2 TIMES DAILY
Qty: 60 TABLET | Refills: 11 | Status: SHIPPED | OUTPATIENT
Start: 2025-08-06

## 2025-08-06 RX ORDER — ONDANSETRON 4 MG/1
4 TABLET, ORALLY DISINTEGRATING ORAL EVERY 6 HOURS PRN
Qty: 10 TABLET | Refills: 0 | Status: SHIPPED | OUTPATIENT
Start: 2025-08-06

## 2025-08-06 RX ORDER — ONDANSETRON 2 MG/ML
4 INJECTION INTRAMUSCULAR; INTRAVENOUS ONCE
Status: COMPLETED | OUTPATIENT
Start: 2025-08-06 | End: 2025-08-06

## 2025-08-06 RX ORDER — SUCRALFATE 1 G/1
1 TABLET ORAL
Qty: 120 TABLET | Refills: 3 | Status: SHIPPED | OUTPATIENT
Start: 2025-08-06

## 2025-08-06 RX ORDER — OMEPRAZOLE 20 MG/1
20 CAPSULE, DELAYED RELEASE ORAL DAILY
Qty: 30 CAPSULE | Refills: 0 | Status: SHIPPED | OUTPATIENT
Start: 2025-08-06

## 2025-08-06 RX ORDER — MORPHINE SULFATE 4 MG/ML
4 INJECTION INTRAVENOUS ONCE
Status: COMPLETED | OUTPATIENT
Start: 2025-08-06 | End: 2025-08-06

## 2025-08-06 RX ORDER — POTASSIUM CHLORIDE 1500 MG/1
20 TABLET, EXTENDED RELEASE ORAL ONCE
Status: COMPLETED | OUTPATIENT
Start: 2025-08-06 | End: 2025-08-06

## 2025-08-06 RX ADMIN — MORPHINE SULFATE 4 MG: 4 INJECTION, SOLUTION INTRAMUSCULAR; INTRAVENOUS at 07:01

## 2025-08-06 RX ADMIN — POTASSIUM CHLORIDE 10 MEQ: 7.46 INJECTION, SOLUTION INTRAVENOUS at 09:18

## 2025-08-06 RX ADMIN — ONDANSETRON 4 MG: 2 INJECTION INTRAMUSCULAR; INTRAVENOUS at 07:01

## 2025-08-06 RX ADMIN — POTASSIUM CHLORIDE 20 MEQ: 1500 TABLET, EXTENDED RELEASE ORAL at 09:12

## 2025-08-06 RX ADMIN — THIAMINE HYDROCHLORIDE: 100 INJECTION, SOLUTION INTRAMUSCULAR; INTRAVENOUS at 07:00

## 2025-08-06 ASSESSMENT — PAIN DESCRIPTION - PAIN TYPE: TYPE: ACUTE PAIN

## 2025-08-08 LAB
BILIRUB FLD-MCNC: 1.8 MG/DL
SPECIMEN SOURCE: NORMAL

## 2025-08-09 LAB
BACTERIA FLD AEROBE CULT: NORMAL
GRAM STN SPEC: NORMAL
SIGNIFICANT IND 70042: NORMAL
SITE SITE: NORMAL
SOURCE SOURCE: NORMAL

## 2025-08-20 ENCOUNTER — HOSPITAL ENCOUNTER (INPATIENT)
Facility: MEDICAL CENTER | Age: 65
LOS: 10 days | End: 2025-08-31
Attending: STUDENT IN AN ORGANIZED HEALTH CARE EDUCATION/TRAINING PROGRAM | Admitting: STUDENT IN AN ORGANIZED HEALTH CARE EDUCATION/TRAINING PROGRAM
Payer: MEDICAID

## 2025-08-20 DIAGNOSIS — Z71.89 ADVANCE CARE PLANNING: ICD-10-CM

## 2025-08-20 DIAGNOSIS — D61.818 PANCYTOPENIA (HCC): ICD-10-CM

## 2025-08-20 DIAGNOSIS — E83.110 HEREDITARY HEMOCHROMATOSIS (HCC): ICD-10-CM

## 2025-08-20 DIAGNOSIS — I10 PRIMARY HYPERTENSION: ICD-10-CM

## 2025-08-20 DIAGNOSIS — T84.84XA PAINFUL ORTHOPAEDIC HARDWARE (HCC): ICD-10-CM

## 2025-08-20 DIAGNOSIS — R33.9 URINARY RETENTION: ICD-10-CM

## 2025-08-20 DIAGNOSIS — F10.10 ALCOHOL ABUSE: ICD-10-CM

## 2025-08-20 DIAGNOSIS — E87.1 HYPONATREMIA: ICD-10-CM

## 2025-08-20 DIAGNOSIS — Z72.0 TOBACCO ABUSE: ICD-10-CM

## 2025-08-20 DIAGNOSIS — R19.5 FECAL OCCULT BLOOD TEST POSITIVE: ICD-10-CM

## 2025-08-20 DIAGNOSIS — L98.8 DRAINING CUTANEOUS SINUS TRACT: ICD-10-CM

## 2025-08-20 DIAGNOSIS — K70.31 ALCOHOLIC CIRRHOSIS OF LIVER WITH ASCITES (HCC): ICD-10-CM

## 2025-08-20 DIAGNOSIS — M25.531 WRIST PAIN, RIGHT: ICD-10-CM

## 2025-08-20 DIAGNOSIS — N30.01 ACUTE CYSTITIS WITH HEMATURIA: ICD-10-CM

## 2025-08-20 DIAGNOSIS — K92.2 GASTROINTESTINAL HEMORRHAGE, UNSPECIFIED GASTROINTESTINAL HEMORRHAGE TYPE: Primary | ICD-10-CM

## 2025-08-20 LAB
ABO + RH BLD: NORMAL
ABO GROUP BLD: NORMAL
ALBUMIN SERPL BCP-MCNC: 2.6 G/DL (ref 3.2–4.9)
ALBUMIN/GLOB SERPL: 0.5 G/DL
ALP SERPL-CCNC: 65 U/L (ref 30–99)
ALT SERPL-CCNC: 21 U/L (ref 2–50)
ANION GAP SERPL CALC-SCNC: 12 MMOL/L (ref 7–16)
APTT PPP: 37.1 SEC (ref 24.7–36)
AST SERPL-CCNC: 43 U/L (ref 12–45)
BASOPHILS # BLD AUTO: 1 % (ref 0–1.8)
BASOPHILS # BLD: 0.05 K/UL (ref 0–0.12)
BILIRUB SERPL-MCNC: 4.3 MG/DL (ref 0.1–1.5)
BLD GP AB SCN SERPL QL: NORMAL
BUN SERPL-MCNC: 4 MG/DL (ref 8–22)
CALCIUM ALBUM COR SERPL-MCNC: 9.6 MG/DL (ref 8.5–10.5)
CALCIUM SERPL-MCNC: 8.5 MG/DL (ref 8.5–10.5)
CHLORIDE SERPL-SCNC: 92 MMOL/L (ref 96–112)
CO2 SERPL-SCNC: 19 MMOL/L (ref 20–33)
CREAT SERPL-MCNC: 0.71 MG/DL (ref 0.5–1.4)
EOSINOPHIL # BLD AUTO: 0.05 K/UL (ref 0–0.51)
EOSINOPHIL NFR BLD: 1 % (ref 0–6.9)
ERYTHROCYTE [DISTWIDTH] IN BLOOD BY AUTOMATED COUNT: 55.8 FL (ref 35.9–50)
GFR SERPLBLD CREATININE-BSD FMLA CKD-EPI: 96 ML/MIN/1.73 M 2
GLOBULIN SER CALC-MCNC: 5.7 G/DL (ref 1.9–3.5)
GLUCOSE SERPL-MCNC: 118 MG/DL (ref 65–99)
HCT VFR BLD AUTO: 32.8 % (ref 37–47)
HGB BLD-MCNC: 11.1 G/DL (ref 12–16)
IMM GRANULOCYTES # BLD AUTO: 0.01 K/UL (ref 0–0.11)
IMM GRANULOCYTES NFR BLD AUTO: 0.2 % (ref 0–0.9)
INR PPP: 1.97 (ref 0.87–1.13)
LIPASE SERPL-CCNC: 70 U/L (ref 11–82)
LYMPHOCYTES # BLD AUTO: 1.05 K/UL (ref 1–4.8)
LYMPHOCYTES NFR BLD: 21.5 % (ref 22–41)
MCH RBC QN AUTO: 35.2 PG (ref 27–33)
MCHC RBC AUTO-ENTMCNC: 33.8 G/DL (ref 32.2–35.5)
MCV RBC AUTO: 104.1 FL (ref 81.4–97.8)
MONOCYTES # BLD AUTO: 0.51 K/UL (ref 0–0.85)
MONOCYTES NFR BLD AUTO: 10.5 % (ref 0–13.4)
NEUTROPHILS # BLD AUTO: 3.21 K/UL (ref 1.82–7.42)
NEUTROPHILS NFR BLD: 65.8 % (ref 44–72)
NRBC # BLD AUTO: 0.02 K/UL
NRBC BLD-RTO: 0.4 /100 WBC (ref 0–0.2)
PLATELET # BLD AUTO: 100 K/UL (ref 164–446)
PMV BLD AUTO: 9.3 FL (ref 9–12.9)
POTASSIUM SERPL-SCNC: 4.2 MMOL/L (ref 3.6–5.5)
PROT SERPL-MCNC: 8.3 G/DL (ref 6–8.2)
PROTHROMBIN TIME: 22.5 SEC (ref 12–14.6)
RBC # BLD AUTO: 3.15 M/UL (ref 4.2–5.4)
RH BLD: NORMAL
SODIUM SERPL-SCNC: 123 MMOL/L (ref 135–145)
WBC # BLD AUTO: 4.9 K/UL (ref 4.8–10.8)

## 2025-08-20 PROCEDURE — 82272 OCCULT BLD FECES 1-3 TESTS: CPT

## 2025-08-20 PROCEDURE — 85730 THROMBOPLASTIN TIME PARTIAL: CPT

## 2025-08-20 PROCEDURE — 96375 TX/PRO/DX INJ NEW DRUG ADDON: CPT

## 2025-08-20 PROCEDURE — 96374 THER/PROPH/DIAG INJ IV PUSH: CPT

## 2025-08-20 PROCEDURE — 36415 COLL VENOUS BLD VENIPUNCTURE: CPT

## 2025-08-20 PROCEDURE — 700101 HCHG RX REV CODE 250: Mod: UD | Performed by: STUDENT IN AN ORGANIZED HEALTH CARE EDUCATION/TRAINING PROGRAM

## 2025-08-20 PROCEDURE — 85025 COMPLETE CBC W/AUTO DIFF WBC: CPT

## 2025-08-20 PROCEDURE — 99285 EMERGENCY DEPT VISIT HI MDM: CPT

## 2025-08-20 PROCEDURE — 86850 RBC ANTIBODY SCREEN: CPT

## 2025-08-20 PROCEDURE — 85610 PROTHROMBIN TIME: CPT

## 2025-08-20 PROCEDURE — 86900 BLOOD TYPING SEROLOGIC ABO: CPT

## 2025-08-20 PROCEDURE — 80053 COMPREHEN METABOLIC PANEL: CPT

## 2025-08-20 PROCEDURE — 86901 BLOOD TYPING SEROLOGIC RH(D): CPT | Mod: 91

## 2025-08-20 PROCEDURE — 83690 ASSAY OF LIPASE: CPT

## 2025-08-20 PROCEDURE — 83930 ASSAY OF BLOOD OSMOLALITY: CPT

## 2025-08-20 PROCEDURE — 99406 BEHAV CHNG SMOKING 3-10 MIN: CPT

## 2025-08-20 RX ORDER — LIDOCAINE HYDROCHLORIDE AND EPINEPHRINE 10; 10 MG/ML; UG/ML
10 INJECTION, SOLUTION INFILTRATION; PERINEURAL ONCE
Status: COMPLETED | OUTPATIENT
Start: 2025-08-20 | End: 2025-08-20

## 2025-08-20 RX ADMIN — LIDOCAINE HYDROCHLORIDE AND EPINEPHRINE 10 ML: 10; 10 INJECTION, SOLUTION INFILTRATION; PERINEURAL at 23:45

## 2025-08-20 ASSESSMENT — LIFESTYLE VARIABLES: HOW OFTEN DO YOU HAVE SIX OR MORE DRINKS ON ONE OCCASION: DAILY OR ALMOST DAILY

## 2025-08-20 ASSESSMENT — PAIN DESCRIPTION - PAIN TYPE: TYPE: ACUTE PAIN

## 2025-08-20 ASSESSMENT — FIBROSIS 4 INDEX: FIB4 SCORE: 5.25

## 2025-08-21 PROBLEM — R19.5 FECAL OCCULT BLOOD TEST POSITIVE: Status: ACTIVE | Noted: 2025-08-21

## 2025-08-21 PROBLEM — N30.01 ACUTE CYSTITIS WITH HEMATURIA: Status: ACTIVE | Noted: 2025-08-21

## 2025-08-21 PROBLEM — Z72.0 TOBACCO ABUSE: Status: ACTIVE | Noted: 2025-08-21

## 2025-08-21 PROBLEM — E87.1 HYPONATREMIA: Status: ACTIVE | Noted: 2025-08-21

## 2025-08-21 PROBLEM — K70.31 ALCOHOLIC CIRRHOSIS OF LIVER WITH ASCITES (HCC): Status: ACTIVE | Noted: 2025-08-21

## 2025-08-21 PROBLEM — Z71.89 ADVANCE CARE PLANNING: Status: ACTIVE | Noted: 2025-08-21

## 2025-08-21 LAB
ALBUMIN FLD-MCNC: 0.7 G/DL
AMPHET UR QL SCN: NEGATIVE
ANION GAP SERPL CALC-SCNC: 10 MMOL/L (ref 7–16)
ANION GAP SERPL CALC-SCNC: 9 MMOL/L (ref 7–16)
APPEARANCE FLD: NORMAL
APPEARANCE UR: CLEAR
BACTERIA #/AREA URNS HPF: ABNORMAL /HPF
BARBITURATES UR QL SCN: NEGATIVE
BASOPHILS NFR FLD: 1 %
BENZODIAZ UR QL SCN: NEGATIVE
BILIRUB UR QL STRIP.AUTO: NEGATIVE
BODY FLD TYPE: NORMAL
BUN SERPL-MCNC: 4 MG/DL (ref 8–22)
BUN SERPL-MCNC: 6 MG/DL (ref 8–22)
BZE UR QL SCN: NEGATIVE
CALCIUM SERPL-MCNC: 8.2 MG/DL (ref 8.5–10.5)
CALCIUM SERPL-MCNC: 8.3 MG/DL (ref 8.5–10.5)
CANNABINOIDS UR QL SCN: NEGATIVE
CASTS URNS QL MICRO: ABNORMAL /LPF (ref 0–2)
CELLS FLD: 6
CHLORIDE SERPL-SCNC: 96 MMOL/L (ref 96–112)
CHLORIDE SERPL-SCNC: 98 MMOL/L (ref 96–112)
CHLORIDE UR-SCNC: <20 MMOL/L
CO2 SERPL-SCNC: 18 MMOL/L (ref 20–33)
CO2 SERPL-SCNC: 19 MMOL/L (ref 20–33)
COLOR FLD: YELLOW
COLOR UR: YELLOW
CREAT SERPL-MCNC: 0.59 MG/DL (ref 0.5–1.4)
CREAT SERPL-MCNC: 0.69 MG/DL (ref 0.5–1.4)
CREAT UR-MCNC: 103 MG/DL
EPITHELIAL CELLS 1715: ABNORMAL /HPF (ref 0–5)
FENTANYL UR QL: NEGATIVE
GFR SERPLBLD CREATININE-BSD FMLA CKD-EPI: 101 ML/MIN/1.73 M 2
GFR SERPLBLD CREATININE-BSD FMLA CKD-EPI: 97 ML/MIN/1.73 M 2
GLUCOSE SERPL-MCNC: 113 MG/DL (ref 65–99)
GLUCOSE SERPL-MCNC: 93 MG/DL (ref 65–99)
GLUCOSE UR STRIP.AUTO-MCNC: NEGATIVE MG/DL
GRAM STN SPEC: NORMAL
HAV IGM SERPL QL IA: NORMAL
HBV CORE IGM SER QL: NORMAL
HBV SURFACE AG SER QL: NORMAL
HCT VFR BLD AUTO: 28.1 % (ref 37–47)
HCT VFR BLD AUTO: 29.2 % (ref 37–47)
HCV AB SER QL: NORMAL
HEMOCCULT STL QL: POSITIVE
HGB BLD-MCNC: 10 G/DL (ref 12–16)
HGB BLD-MCNC: 9.7 G/DL (ref 12–16)
HIV 1+2 AB+HIV1 P24 AG SERPL QL IA: NORMAL
HYALINE CAST   1831: PRESENT /LPF
KETONES UR STRIP.AUTO-MCNC: NEGATIVE MG/DL
LEUKOCYTE ESTERASE UR QL STRIP.AUTO: ABNORMAL
LYMPHOCYTES NFR FLD: 42 %
METHADONE UR QL SCN: NEGATIVE
MICRO URNS: ABNORMAL
MONOS+MACROS NFR FLD MANUAL: 49 %
NEUTROPHILS NFR FLD: 2 %
NITRITE UR QL STRIP.AUTO: POSITIVE
NUC CELL # FLD: 136 CELLS/UL
OPIATES UR QL SCN: NEGATIVE
OSMOLALITY SERPL: 286 MOSM/KG H2O (ref 278–298)
OSMOLALITY UR: 245 MOSM/KG H2O (ref 300–900)
OXYCODONE UR QL SCN: NEGATIVE
PCP UR QL SCN: NEGATIVE
PH UR STRIP.AUTO: 5 [PH] (ref 5–8)
POTASSIUM SERPL-SCNC: 4.4 MMOL/L (ref 3.6–5.5)
POTASSIUM SERPL-SCNC: 4.6 MMOL/L (ref 3.6–5.5)
PROPOXYPH UR QL SCN: NEGATIVE
PROT FLD-MCNC: 1.9 G/DL
PROT UR QL STRIP: NEGATIVE MG/DL
RBC # FLD: <2000 CELLS/UL
RBC # URNS HPF: ABNORMAL /HPF (ref 0–2)
RBC UR QL AUTO: ABNORMAL
SIGNIFICANT IND 70042: NORMAL
SITE SITE: NORMAL
SODIUM SERPL-SCNC: 125 MMOL/L (ref 135–145)
SODIUM SERPL-SCNC: 125 MMOL/L (ref 135–145)
SODIUM UR-SCNC: <20 MMOL/L
SOURCE SOURCE: NORMAL
SP GR UR STRIP.AUTO: 1.01
UROBILINOGEN UR STRIP.AUTO-MCNC: 2 EU/DL
WBC #/AREA URNS HPF: ABNORMAL /HPF

## 2025-08-21 PROCEDURE — 700111 HCHG RX REV CODE 636 W/ 250 OVERRIDE (IP): Performed by: STUDENT IN AN ORGANIZED HEALTH CARE EDUCATION/TRAINING PROGRAM

## 2025-08-21 PROCEDURE — 87205 SMEAR GRAM STAIN: CPT

## 2025-08-21 PROCEDURE — 84157 ASSAY OF PROTEIN OTHER: CPT

## 2025-08-21 PROCEDURE — 82570 ASSAY OF URINE CREATININE: CPT

## 2025-08-21 PROCEDURE — 80074 ACUTE HEPATITIS PANEL: CPT

## 2025-08-21 PROCEDURE — A9270 NON-COVERED ITEM OR SERVICE: HCPCS | Performed by: STUDENT IN AN ORGANIZED HEALTH CARE EDUCATION/TRAINING PROGRAM

## 2025-08-21 PROCEDURE — 700105 HCHG RX REV CODE 258

## 2025-08-21 PROCEDURE — 700102 HCHG RX REV CODE 250 W/ 637 OVERRIDE(OP)

## 2025-08-21 PROCEDURE — 80048 BASIC METABOLIC PNL TOTAL CA: CPT | Mod: 91

## 2025-08-21 PROCEDURE — 97602 WOUND(S) CARE NON-SELECTIVE: CPT

## 2025-08-21 PROCEDURE — 84300 ASSAY OF URINE SODIUM: CPT

## 2025-08-21 PROCEDURE — A9270 NON-COVERED ITEM OR SERVICE: HCPCS

## 2025-08-21 PROCEDURE — 87070 CULTURE OTHR SPECIMN AEROBIC: CPT

## 2025-08-21 PROCEDURE — 82042 OTHER SOURCE ALBUMIN QUAN EA: CPT

## 2025-08-21 PROCEDURE — 700105 HCHG RX REV CODE 258: Performed by: STUDENT IN AN ORGANIZED HEALTH CARE EDUCATION/TRAINING PROGRAM

## 2025-08-21 PROCEDURE — 770006 HCHG ROOM/CARE - MED/SURG/GYN SEMI*

## 2025-08-21 PROCEDURE — 87015 SPECIMEN INFECT AGNT CONCNTJ: CPT

## 2025-08-21 PROCEDURE — 82436 ASSAY OF URINE CHLORIDE: CPT

## 2025-08-21 PROCEDURE — 83935 ASSAY OF URINE OSMOLALITY: CPT

## 2025-08-21 PROCEDURE — 87389 HIV-1 AG W/HIV-1&-2 AB AG IA: CPT

## 2025-08-21 PROCEDURE — 36415 COLL VENOUS BLD VENIPUNCTURE: CPT

## 2025-08-21 PROCEDURE — 81001 URINALYSIS AUTO W/SCOPE: CPT

## 2025-08-21 PROCEDURE — 85018 HEMOGLOBIN: CPT | Mod: 91

## 2025-08-21 PROCEDURE — 700102 HCHG RX REV CODE 250 W/ 637 OVERRIDE(OP): Performed by: STUDENT IN AN ORGANIZED HEALTH CARE EDUCATION/TRAINING PROGRAM

## 2025-08-21 PROCEDURE — 89051 BODY FLUID CELL COUNT: CPT

## 2025-08-21 PROCEDURE — 85014 HEMATOCRIT: CPT | Mod: 91

## 2025-08-21 PROCEDURE — 80307 DRUG TEST PRSMV CHEM ANLYZR: CPT

## 2025-08-21 PROCEDURE — 700111 HCHG RX REV CODE 636 W/ 250 OVERRIDE (IP): Mod: UD | Performed by: STUDENT IN AN ORGANIZED HEALTH CARE EDUCATION/TRAINING PROGRAM

## 2025-08-21 RX ORDER — GAUZE BANDAGE 2" X 2"
100 BANDAGE TOPICAL DAILY
Status: DISCONTINUED | OUTPATIENT
Start: 2025-08-21 | End: 2025-08-31 | Stop reason: HOSPADM

## 2025-08-21 RX ORDER — SPIRONOLACTONE 50 MG/1
50 TABLET, FILM COATED ORAL
Status: DISCONTINUED | OUTPATIENT
Start: 2025-08-21 | End: 2025-08-23

## 2025-08-21 RX ORDER — ALBUMIN (HUMAN) 12.5 G/50ML
50 SOLUTION INTRAVENOUS ONCE
Status: CANCELLED | OUTPATIENT
Start: 2025-08-21 | End: 2025-08-21

## 2025-08-21 RX ORDER — SUCRALFATE 1 G/1
1 TABLET ORAL
Status: DISCONTINUED | OUTPATIENT
Start: 2025-08-21 | End: 2025-08-31 | Stop reason: HOSPADM

## 2025-08-21 RX ORDER — LISINOPRIL AND HYDROCHLOROTHIAZIDE 12.5; 2 MG/1; MG/1
1 TABLET ORAL DAILY
Status: ON HOLD | COMMUNITY
End: 2025-08-31

## 2025-08-21 RX ORDER — AMOXICILLIN 250 MG
2 CAPSULE ORAL EVERY EVENING
Status: DISCONTINUED | OUTPATIENT
Start: 2025-08-21 | End: 2025-08-31 | Stop reason: HOSPADM

## 2025-08-21 RX ORDER — LISINOPRIL AND HYDROCHLOROTHIAZIDE 12.5; 2 MG/1; MG/1
1 TABLET ORAL DAILY
Status: DISCONTINUED | OUTPATIENT
Start: 2025-08-21 | End: 2025-08-21

## 2025-08-21 RX ORDER — PANTOPRAZOLE SODIUM 40 MG/10ML
80 INJECTION, POWDER, LYOPHILIZED, FOR SOLUTION INTRAVENOUS ONCE
Status: COMPLETED | OUTPATIENT
Start: 2025-08-21 | End: 2025-08-21

## 2025-08-21 RX ORDER — FUROSEMIDE 20 MG/1
20 TABLET ORAL
Status: DISCONTINUED | OUTPATIENT
Start: 2025-08-21 | End: 2025-08-22

## 2025-08-21 RX ORDER — ATORVASTATIN CALCIUM 10 MG/1
10 TABLET, FILM COATED ORAL DAILY
Status: DISCONTINUED | OUTPATIENT
Start: 2025-08-21 | End: 2025-08-31 | Stop reason: HOSPADM

## 2025-08-21 RX ORDER — LANOLIN ALCOHOL/MO/W.PET/CERES
100 CREAM (GRAM) TOPICAL DAILY
COMMUNITY
Start: 2025-07-29

## 2025-08-21 RX ORDER — SODIUM CHLORIDE 9 MG/ML
INJECTION, SOLUTION INTRAVENOUS CONTINUOUS
Status: DISCONTINUED | OUTPATIENT
Start: 2025-08-21 | End: 2025-08-29

## 2025-08-21 RX ORDER — LISINOPRIL 20 MG/1
20 TABLET ORAL
Status: DISCONTINUED | OUTPATIENT
Start: 2025-08-21 | End: 2025-08-21

## 2025-08-21 RX ORDER — ALBUTEROL SULFATE 90 UG/1
2 INHALANT RESPIRATORY (INHALATION) EVERY 6 HOURS PRN
COMMUNITY

## 2025-08-21 RX ORDER — FOLIC ACID 1 MG/1
1 TABLET ORAL DAILY
Status: DISCONTINUED | OUTPATIENT
Start: 2025-08-21 | End: 2025-08-31 | Stop reason: HOSPADM

## 2025-08-21 RX ORDER — POLYETHYLENE GLYCOL 3350 17 G/17G
1 POWDER, FOR SOLUTION ORAL
Status: DISCONTINUED | OUTPATIENT
Start: 2025-08-21 | End: 2025-08-31 | Stop reason: HOSPADM

## 2025-08-21 RX ORDER — FOLIC ACID 0.4 MG
400 TABLET ORAL DAILY
COMMUNITY
Start: 2025-07-29

## 2025-08-21 RX ORDER — ACETAMINOPHEN 325 MG/1
650 TABLET ORAL EVERY 6 HOURS PRN
Status: DISCONTINUED | OUTPATIENT
Start: 2025-08-21 | End: 2025-08-31 | Stop reason: HOSPADM

## 2025-08-21 RX ORDER — OXYCODONE HYDROCHLORIDE 5 MG/1
5 TABLET ORAL
Refills: 0 | Status: DISCONTINUED | OUTPATIENT
Start: 2025-08-21 | End: 2025-08-31 | Stop reason: HOSPADM

## 2025-08-21 RX ORDER — HYDROMORPHONE HYDROCHLORIDE 1 MG/ML
0.5 INJECTION, SOLUTION INTRAMUSCULAR; INTRAVENOUS; SUBCUTANEOUS
Status: DISCONTINUED | OUTPATIENT
Start: 2025-08-21 | End: 2025-08-31 | Stop reason: HOSPADM

## 2025-08-21 RX ORDER — OXYCODONE HYDROCHLORIDE 10 MG/1
10 TABLET ORAL
Refills: 0 | Status: DISCONTINUED | OUTPATIENT
Start: 2025-08-21 | End: 2025-08-31 | Stop reason: HOSPADM

## 2025-08-21 RX ORDER — HYDROCHLOROTHIAZIDE 12.5 MG/1
12.5 TABLET ORAL
Status: DISCONTINUED | OUTPATIENT
Start: 2025-08-21 | End: 2025-08-21

## 2025-08-21 RX ORDER — CEFTRIAXONE 2 G/1
2000 INJECTION, POWDER, FOR SOLUTION INTRAMUSCULAR; INTRAVENOUS ONCE
Status: COMPLETED | OUTPATIENT
Start: 2025-08-21 | End: 2025-08-21

## 2025-08-21 RX ORDER — ONDANSETRON 4 MG/1
4 TABLET, ORALLY DISINTEGRATING ORAL EVERY 6 HOURS PRN
Status: DISCONTINUED | OUTPATIENT
Start: 2025-08-21 | End: 2025-08-31 | Stop reason: HOSPADM

## 2025-08-21 RX ORDER — ESCITALOPRAM OXALATE 10 MG/1
10 TABLET ORAL DAILY
Status: DISCONTINUED | OUTPATIENT
Start: 2025-08-21 | End: 2025-08-31 | Stop reason: HOSPADM

## 2025-08-21 RX ORDER — PANTOPRAZOLE SODIUM 40 MG/10ML
40 INJECTION, POWDER, LYOPHILIZED, FOR SOLUTION INTRAVENOUS DAILY
Status: DISCONTINUED | OUTPATIENT
Start: 2025-08-21 | End: 2025-08-22

## 2025-08-21 RX ADMIN — SENNOSIDES, DOCUSATE SODIUM 2 TABLET: 50; 8.6 TABLET, FILM COATED ORAL at 18:12

## 2025-08-21 RX ADMIN — SUCRALFATE 1 G: 1 TABLET ORAL at 21:39

## 2025-08-21 RX ADMIN — SUCRALFATE 1 G: 1 TABLET ORAL at 07:54

## 2025-08-21 RX ADMIN — CEFTRIAXONE SODIUM 2000 MG: 2 INJECTION, POWDER, FOR SOLUTION INTRAMUSCULAR; INTRAVENOUS at 01:41

## 2025-08-21 RX ADMIN — FOLIC ACID 1 MG: 1 TABLET ORAL at 05:27

## 2025-08-21 RX ADMIN — PANTOPRAZOLE SODIUM 40 MG: 40 INJECTION, POWDER, FOR SOLUTION INTRAVENOUS at 05:53

## 2025-08-21 RX ADMIN — THERA TABS 1 TABLET: TAB at 05:27

## 2025-08-21 RX ADMIN — SODIUM CHLORIDE: 9 INJECTION, SOLUTION INTRAVENOUS at 21:42

## 2025-08-21 RX ADMIN — SUCRALFATE 1 G: 1 TABLET ORAL at 12:05

## 2025-08-21 RX ADMIN — SUCRALFATE 1 G: 1 TABLET ORAL at 18:12

## 2025-08-21 RX ADMIN — Medication 100 MG: at 05:27

## 2025-08-21 RX ADMIN — SPIRONOLACTONE 50 MG: 50 TABLET ORAL at 05:27

## 2025-08-21 RX ADMIN — ESCITALOPRAM OXALATE 10 MG: 10 TABLET ORAL at 05:27

## 2025-08-21 RX ADMIN — PANTOPRAZOLE SODIUM 80 MG: 40 INJECTION, POWDER, FOR SOLUTION INTRAVENOUS at 01:41

## 2025-08-21 RX ADMIN — FUROSEMIDE 20 MG: 20 TABLET ORAL at 05:27

## 2025-08-21 RX ADMIN — ATORVASTATIN CALCIUM 10 MG: 10 TABLET, FILM COATED ORAL at 05:27

## 2025-08-21 RX ADMIN — CEFAZOLIN 2 G: 2 INJECTION, POWDER, FOR SOLUTION INTRAVENOUS at 21:45

## 2025-08-21 ASSESSMENT — COGNITIVE AND FUNCTIONAL STATUS - GENERAL
HELP NEEDED FOR BATHING: A LITTLE
STANDING UP FROM CHAIR USING ARMS: A LITTLE
CLIMB 3 TO 5 STEPS WITH RAILING: A LOT
SUGGESTED CMS G CODE MODIFIER DAILY ACTIVITY: CI
MOBILITY SCORE: 19
SUGGESTED CMS G CODE MODIFIER MOBILITY: CK
WALKING IN HOSPITAL ROOM: A LITTLE
DAILY ACTIVITIY SCORE: 23
MOVING TO AND FROM BED TO CHAIR: A LITTLE

## 2025-08-21 ASSESSMENT — ENCOUNTER SYMPTOMS
CHILLS: 0
FEVER: 0
DIZZINESS: 0
ABDOMINAL PAIN: 0
PALPITATIONS: 0
HEADACHES: 0
NAUSEA: 0
SHORTNESS OF BREATH: 1
DIARRHEA: 0
SHORTNESS OF BREATH: 0
HEARTBURN: 0
VOMITING: 0
COUGH: 0

## 2025-08-21 ASSESSMENT — PAIN DESCRIPTION - PAIN TYPE: TYPE: ACUTE PAIN

## 2025-08-21 ASSESSMENT — PATIENT HEALTH QUESTIONNAIRE - PHQ9
SUM OF ALL RESPONSES TO PHQ9 QUESTIONS 1 AND 2: 0
1. LITTLE INTEREST OR PLEASURE IN DOING THINGS: NOT AT ALL
2. FEELING DOWN, DEPRESSED, IRRITABLE, OR HOPELESS: NOT AT ALL

## 2025-08-21 ASSESSMENT — LIFESTYLE VARIABLES
CONSUMPTION TOTAL: INCOMPLETE
AVERAGE NUMBER OF DAYS PER WEEK YOU HAVE A DRINK CONTAINING ALCOHOL: 7
HOW MANY TIMES IN THE PAST YEAR HAVE YOU HAD 5 OR MORE DRINKS IN A DAY: 300
ALCOHOL_USE: YES
ON A TYPICAL DAY WHEN YOU DRINK ALCOHOL HOW MANY DRINKS DO YOU HAVE: 12

## 2025-08-22 ENCOUNTER — APPOINTMENT (OUTPATIENT)
Dept: RADIOLOGY | Facility: MEDICAL CENTER | Age: 65
End: 2025-08-22
Attending: STUDENT IN AN ORGANIZED HEALTH CARE EDUCATION/TRAINING PROGRAM
Payer: MEDICAID

## 2025-08-22 LAB
ALBUMIN SERPL BCP-MCNC: 2.1 G/DL (ref 3.2–4.9)
ALBUMIN/GLOB SERPL: 0.5 G/DL
ALP SERPL-CCNC: 52 U/L (ref 30–99)
ALT SERPL-CCNC: 15 U/L (ref 2–50)
ANION GAP SERPL CALC-SCNC: 10 MMOL/L (ref 7–16)
ANION GAP SERPL CALC-SCNC: 7 MMOL/L (ref 7–16)
ANION GAP SERPL CALC-SCNC: 9 MMOL/L (ref 7–16)
AST SERPL-CCNC: 28 U/L (ref 12–45)
BILIRUB SERPL-MCNC: 3.2 MG/DL (ref 0.1–1.5)
BUN SERPL-MCNC: 7 MG/DL (ref 8–22)
CALCIUM ALBUM COR SERPL-MCNC: 9.6 MG/DL (ref 8.5–10.5)
CALCIUM SERPL-MCNC: 7.9 MG/DL (ref 8.5–10.5)
CALCIUM SERPL-MCNC: 7.9 MG/DL (ref 8.5–10.5)
CALCIUM SERPL-MCNC: 8.1 MG/DL (ref 8.5–10.5)
CHLORIDE SERPL-SCNC: 100 MMOL/L (ref 96–112)
CHLORIDE SERPL-SCNC: 97 MMOL/L (ref 96–112)
CHLORIDE SERPL-SCNC: 97 MMOL/L (ref 96–112)
CO2 SERPL-SCNC: 19 MMOL/L (ref 20–33)
CO2 SERPL-SCNC: 20 MMOL/L (ref 20–33)
CO2 SERPL-SCNC: 20 MMOL/L (ref 20–33)
CREAT SERPL-MCNC: 0.69 MG/DL (ref 0.5–1.4)
CREAT SERPL-MCNC: 0.73 MG/DL (ref 0.5–1.4)
CREAT SERPL-MCNC: 0.76 MG/DL (ref 0.5–1.4)
ERYTHROCYTE [DISTWIDTH] IN BLOOD BY AUTOMATED COUNT: 55.2 FL (ref 35.9–50)
FERRITIN SERPL-MCNC: 165 NG/ML (ref 10–291)
GFR SERPLBLD CREATININE-BSD FMLA CKD-EPI: 88 ML/MIN/1.73 M 2
GFR SERPLBLD CREATININE-BSD FMLA CKD-EPI: 92 ML/MIN/1.73 M 2
GFR SERPLBLD CREATININE-BSD FMLA CKD-EPI: 97 ML/MIN/1.73 M 2
GLOBULIN SER CALC-MCNC: 4.4 G/DL (ref 1.9–3.5)
GLUCOSE SERPL-MCNC: 101 MG/DL (ref 65–99)
GLUCOSE SERPL-MCNC: 88 MG/DL (ref 65–99)
GLUCOSE SERPL-MCNC: 96 MG/DL (ref 65–99)
HCT VFR BLD AUTO: 26.8 % (ref 37–47)
HCT VFR BLD AUTO: 28.3 % (ref 37–47)
HCT VFR BLD AUTO: 29.9 % (ref 37–47)
HGB BLD-MCNC: 10.4 G/DL (ref 12–16)
HGB BLD-MCNC: 9.2 G/DL (ref 12–16)
HGB BLD-MCNC: 9.7 G/DL (ref 12–16)
MCH RBC QN AUTO: 35 PG (ref 27–33)
MCHC RBC AUTO-ENTMCNC: 34.3 G/DL (ref 32.2–35.5)
MCV RBC AUTO: 101.9 FL (ref 81.4–97.8)
PLATELET # BLD AUTO: 97 K/UL (ref 164–446)
PLATELETS.RETICULATED NFR BLD AUTO: 1.3 % (ref 0.6–13.1)
PMV BLD AUTO: 9.6 FL (ref 9–12.9)
POTASSIUM SERPL-SCNC: 4 MMOL/L (ref 3.6–5.5)
POTASSIUM SERPL-SCNC: 4.2 MMOL/L (ref 3.6–5.5)
POTASSIUM SERPL-SCNC: 4.3 MMOL/L (ref 3.6–5.5)
PROT SERPL-MCNC: 6.5 G/DL (ref 6–8.2)
RBC # BLD AUTO: 2.63 M/UL (ref 4.2–5.4)
SODIUM SERPL-SCNC: 125 MMOL/L (ref 135–145)
SODIUM SERPL-SCNC: 127 MMOL/L (ref 135–145)
SODIUM SERPL-SCNC: 127 MMOL/L (ref 135–145)
WBC # BLD AUTO: 4.4 K/UL (ref 4.8–10.8)

## 2025-08-22 PROCEDURE — 700111 HCHG RX REV CODE 636 W/ 250 OVERRIDE (IP): Mod: JZ | Performed by: INTERNAL MEDICINE

## 2025-08-22 PROCEDURE — 85055 RETICULATED PLATELET ASSAY: CPT

## 2025-08-22 PROCEDURE — P9047 ALBUMIN (HUMAN), 25%, 50ML: HCPCS | Mod: JZ | Performed by: INTERNAL MEDICINE

## 2025-08-22 PROCEDURE — 80053 COMPREHEN METABOLIC PANEL: CPT

## 2025-08-22 PROCEDURE — 85014 HEMATOCRIT: CPT

## 2025-08-22 PROCEDURE — 700111 HCHG RX REV CODE 636 W/ 250 OVERRIDE (IP): Mod: JZ | Performed by: STUDENT IN AN ORGANIZED HEALTH CARE EDUCATION/TRAINING PROGRAM

## 2025-08-22 PROCEDURE — 80048 BASIC METABOLIC PNL TOTAL CA: CPT

## 2025-08-22 PROCEDURE — 85018 HEMOGLOBIN: CPT | Mod: 91

## 2025-08-22 PROCEDURE — 82728 ASSAY OF FERRITIN: CPT

## 2025-08-22 PROCEDURE — A9270 NON-COVERED ITEM OR SERVICE: HCPCS | Performed by: STUDENT IN AN ORGANIZED HEALTH CARE EDUCATION/TRAINING PROGRAM

## 2025-08-22 PROCEDURE — 700102 HCHG RX REV CODE 250 W/ 637 OVERRIDE(OP): Performed by: INTERNAL MEDICINE

## 2025-08-22 PROCEDURE — 700105 HCHG RX REV CODE 258: Performed by: STUDENT IN AN ORGANIZED HEALTH CARE EDUCATION/TRAINING PROGRAM

## 2025-08-22 PROCEDURE — 700102 HCHG RX REV CODE 250 W/ 637 OVERRIDE(OP): Performed by: STUDENT IN AN ORGANIZED HEALTH CARE EDUCATION/TRAINING PROGRAM

## 2025-08-22 PROCEDURE — A9270 NON-COVERED ITEM OR SERVICE: HCPCS | Performed by: INTERNAL MEDICINE

## 2025-08-22 PROCEDURE — 4410569 US-PARACENTESIS, ABD WITH IMAGING

## 2025-08-22 PROCEDURE — 770006 HCHG ROOM/CARE - MED/SURG/GYN SEMI*

## 2025-08-22 PROCEDURE — 36415 COLL VENOUS BLD VENIPUNCTURE: CPT

## 2025-08-22 PROCEDURE — 85027 COMPLETE CBC AUTOMATED: CPT

## 2025-08-22 RX ORDER — ALBUMIN (HUMAN) 12.5 G/50ML
25 SOLUTION INTRAVENOUS ONCE
Status: COMPLETED | OUTPATIENT
Start: 2025-08-22 | End: 2025-08-22

## 2025-08-22 RX ORDER — OMEPRAZOLE 20 MG/1
20 CAPSULE, DELAYED RELEASE ORAL DAILY
Status: DISCONTINUED | OUTPATIENT
Start: 2025-08-23 | End: 2025-08-31 | Stop reason: HOSPADM

## 2025-08-22 RX ORDER — TAMSULOSIN HYDROCHLORIDE 0.4 MG/1
0.4 CAPSULE ORAL
Status: DISCONTINUED | OUTPATIENT
Start: 2025-08-22 | End: 2025-08-31 | Stop reason: HOSPADM

## 2025-08-22 RX ORDER — FUROSEMIDE 40 MG/1
40 TABLET ORAL
Status: DISCONTINUED | OUTPATIENT
Start: 2025-08-23 | End: 2025-08-23

## 2025-08-22 RX ADMIN — THERA TABS 1 TABLET: TAB at 06:04

## 2025-08-22 RX ADMIN — FOLIC ACID 1 MG: 1 TABLET ORAL at 06:04

## 2025-08-22 RX ADMIN — Medication 100 MG: at 06:04

## 2025-08-22 RX ADMIN — SUCRALFATE 1 G: 1 TABLET ORAL at 17:39

## 2025-08-22 RX ADMIN — ATORVASTATIN CALCIUM 10 MG: 10 TABLET, FILM COATED ORAL at 06:04

## 2025-08-22 RX ADMIN — SUCRALFATE 1 G: 1 TABLET ORAL at 21:46

## 2025-08-22 RX ADMIN — SUCRALFATE 1 G: 1 TABLET ORAL at 12:24

## 2025-08-22 RX ADMIN — CEFAZOLIN 2 G: 2 INJECTION, POWDER, FOR SOLUTION INTRAVENOUS at 06:03

## 2025-08-22 RX ADMIN — SUCRALFATE 1 G: 1 TABLET ORAL at 08:25

## 2025-08-22 RX ADMIN — CEFAZOLIN 2 G: 2 INJECTION, POWDER, FOR SOLUTION INTRAVENOUS at 14:36

## 2025-08-22 RX ADMIN — PANTOPRAZOLE SODIUM 40 MG: 40 INJECTION, POWDER, FOR SOLUTION INTRAVENOUS at 06:04

## 2025-08-22 RX ADMIN — ALBUMIN (HUMAN) 25 G: 0.25 INJECTION, SOLUTION INTRAVENOUS at 16:15

## 2025-08-22 RX ADMIN — CEFAZOLIN 2 G: 2 INJECTION, POWDER, FOR SOLUTION INTRAVENOUS at 21:48

## 2025-08-22 RX ADMIN — TAMSULOSIN HYDROCHLORIDE 0.4 MG: 0.4 CAPSULE ORAL at 12:24

## 2025-08-22 RX ADMIN — ESCITALOPRAM OXALATE 10 MG: 10 TABLET ORAL at 06:04

## 2025-08-22 ASSESSMENT — ENCOUNTER SYMPTOMS
WEAKNESS: 1
SHORTNESS OF BREATH: 0
NERVOUS/ANXIOUS: 1
TREMORS: 1
NAUSEA: 0
ABDOMINAL PAIN: 1

## 2025-08-22 ASSESSMENT — PATIENT HEALTH QUESTIONNAIRE - PHQ9
2. FEELING DOWN, DEPRESSED, IRRITABLE, OR HOPELESS: NOT AT ALL
2. FEELING DOWN, DEPRESSED, IRRITABLE, OR HOPELESS: NOT AT ALL
1. LITTLE INTEREST OR PLEASURE IN DOING THINGS: NOT AT ALL
1. LITTLE INTEREST OR PLEASURE IN DOING THINGS: NOT AT ALL
SUM OF ALL RESPONSES TO PHQ9 QUESTIONS 1 AND 2: 0
SUM OF ALL RESPONSES TO PHQ9 QUESTIONS 1 AND 2: 0

## 2025-08-23 LAB
ALBUMIN SERPL BCP-MCNC: 2.1 G/DL (ref 3.2–4.9)
ALBUMIN/GLOB SERPL: 0.6 G/DL
ALP SERPL-CCNC: 48 U/L (ref 30–99)
ALT SERPL-CCNC: 10 U/L (ref 2–50)
ANION GAP SERPL CALC-SCNC: 8 MMOL/L (ref 7–16)
AST SERPL-CCNC: 25 U/L (ref 12–45)
BILIRUB SERPL-MCNC: 2.8 MG/DL (ref 0.1–1.5)
BUN SERPL-MCNC: 6 MG/DL (ref 8–22)
CALCIUM ALBUM COR SERPL-MCNC: 9.2 MG/DL (ref 8.5–10.5)
CALCIUM SERPL-MCNC: 7.7 MG/DL (ref 8.5–10.5)
CHLORIDE SERPL-SCNC: 100 MMOL/L (ref 96–112)
CO2 SERPL-SCNC: 19 MMOL/L (ref 20–33)
CREAT SERPL-MCNC: 0.63 MG/DL (ref 0.5–1.4)
ERYTHROCYTE [DISTWIDTH] IN BLOOD BY AUTOMATED COUNT: 54 FL (ref 35.9–50)
GFR SERPLBLD CREATININE-BSD FMLA CKD-EPI: 100 ML/MIN/1.73 M 2
GLOBULIN SER CALC-MCNC: 3.7 G/DL (ref 1.9–3.5)
GLUCOSE SERPL-MCNC: 94 MG/DL (ref 65–99)
HCT VFR BLD AUTO: 25.6 % (ref 37–47)
HCT VFR BLD AUTO: 28.4 % (ref 37–47)
HGB BLD-MCNC: 8.9 G/DL (ref 12–16)
HGB BLD-MCNC: 9.9 G/DL (ref 12–16)
MCH RBC QN AUTO: 34.8 PG (ref 27–33)
MCHC RBC AUTO-ENTMCNC: 34.8 G/DL (ref 32.2–35.5)
MCV RBC AUTO: 100 FL (ref 81.4–97.8)
PLATELET # BLD AUTO: 92 K/UL (ref 164–446)
PLATELETS.RETICULATED NFR BLD AUTO: 1.8 % (ref 0.6–13.1)
PMV BLD AUTO: 9.4 FL (ref 9–12.9)
POTASSIUM SERPL-SCNC: 4 MMOL/L (ref 3.6–5.5)
PROT SERPL-MCNC: 5.8 G/DL (ref 6–8.2)
RBC # BLD AUTO: 2.56 M/UL (ref 4.2–5.4)
SODIUM SERPL-SCNC: 127 MMOL/L (ref 135–145)
WBC # BLD AUTO: 4.3 K/UL (ref 4.8–10.8)

## 2025-08-23 PROCEDURE — A9270 NON-COVERED ITEM OR SERVICE: HCPCS

## 2025-08-23 PROCEDURE — 700111 HCHG RX REV CODE 636 W/ 250 OVERRIDE (IP): Mod: JZ | Performed by: STUDENT IN AN ORGANIZED HEALTH CARE EDUCATION/TRAINING PROGRAM

## 2025-08-23 PROCEDURE — 85018 HEMOGLOBIN: CPT

## 2025-08-23 PROCEDURE — 85027 COMPLETE CBC AUTOMATED: CPT

## 2025-08-23 PROCEDURE — A9270 NON-COVERED ITEM OR SERVICE: HCPCS | Performed by: INTERNAL MEDICINE

## 2025-08-23 PROCEDURE — 85055 RETICULATED PLATELET ASSAY: CPT

## 2025-08-23 PROCEDURE — 36415 COLL VENOUS BLD VENIPUNCTURE: CPT

## 2025-08-23 PROCEDURE — 700105 HCHG RX REV CODE 258: Performed by: STUDENT IN AN ORGANIZED HEALTH CARE EDUCATION/TRAINING PROGRAM

## 2025-08-23 PROCEDURE — 770006 HCHG ROOM/CARE - MED/SURG/GYN SEMI*

## 2025-08-23 PROCEDURE — 85014 HEMATOCRIT: CPT

## 2025-08-23 PROCEDURE — 80053 COMPREHEN METABOLIC PANEL: CPT

## 2025-08-23 PROCEDURE — 700102 HCHG RX REV CODE 250 W/ 637 OVERRIDE(OP)

## 2025-08-23 PROCEDURE — 700111 HCHG RX REV CODE 636 W/ 250 OVERRIDE (IP): Mod: JZ | Performed by: INTERNAL MEDICINE

## 2025-08-23 PROCEDURE — 700102 HCHG RX REV CODE 250 W/ 637 OVERRIDE(OP): Performed by: INTERNAL MEDICINE

## 2025-08-23 PROCEDURE — 700102 HCHG RX REV CODE 250 W/ 637 OVERRIDE(OP): Performed by: STUDENT IN AN ORGANIZED HEALTH CARE EDUCATION/TRAINING PROGRAM

## 2025-08-23 PROCEDURE — A9270 NON-COVERED ITEM OR SERVICE: HCPCS | Performed by: STUDENT IN AN ORGANIZED HEALTH CARE EDUCATION/TRAINING PROGRAM

## 2025-08-23 RX ORDER — SPIRONOLACTONE 100 MG/1
100 TABLET, FILM COATED ORAL
Status: DISCONTINUED | OUTPATIENT
Start: 2025-08-24 | End: 2025-08-25

## 2025-08-23 RX ORDER — FUROSEMIDE 10 MG/ML
40 INJECTION INTRAMUSCULAR; INTRAVENOUS 2 TIMES DAILY
Status: DISCONTINUED | OUTPATIENT
Start: 2025-08-23 | End: 2025-08-26

## 2025-08-23 RX ADMIN — FOLIC ACID 1 MG: 1 TABLET ORAL at 05:38

## 2025-08-23 RX ADMIN — ATORVASTATIN CALCIUM 10 MG: 10 TABLET, FILM COATED ORAL at 05:48

## 2025-08-23 RX ADMIN — THERA TABS 1 TABLET: TAB at 05:38

## 2025-08-23 RX ADMIN — ESCITALOPRAM OXALATE 10 MG: 10 TABLET ORAL at 05:48

## 2025-08-23 RX ADMIN — Medication 100 MG: at 05:38

## 2025-08-23 RX ADMIN — SUCRALFATE 1 G: 1 TABLET ORAL at 21:26

## 2025-08-23 RX ADMIN — SUCRALFATE 1 G: 1 TABLET ORAL at 13:04

## 2025-08-23 RX ADMIN — CEFAZOLIN 2 G: 2 INJECTION, POWDER, FOR SOLUTION INTRAVENOUS at 13:04

## 2025-08-23 RX ADMIN — OMEPRAZOLE 20 MG: 20 CAPSULE, DELAYED RELEASE ORAL at 05:48

## 2025-08-23 RX ADMIN — FUROSEMIDE 40 MG: 10 INJECTION, SOLUTION INTRAVENOUS at 17:39

## 2025-08-23 RX ADMIN — SENNOSIDES, DOCUSATE SODIUM 2 TABLET: 50; 8.6 TABLET, FILM COATED ORAL at 17:39

## 2025-08-23 RX ADMIN — TAMSULOSIN HYDROCHLORIDE 0.4 MG: 0.4 CAPSULE ORAL at 08:20

## 2025-08-23 RX ADMIN — SUCRALFATE 1 G: 1 TABLET ORAL at 17:39

## 2025-08-23 RX ADMIN — CEFAZOLIN 2 G: 2 INJECTION, POWDER, FOR SOLUTION INTRAVENOUS at 21:38

## 2025-08-23 RX ADMIN — SUCRALFATE 1 G: 1 TABLET ORAL at 08:20

## 2025-08-23 RX ADMIN — CEFAZOLIN 2 G: 2 INJECTION, POWDER, FOR SOLUTION INTRAVENOUS at 05:45

## 2025-08-23 ASSESSMENT — ENCOUNTER SYMPTOMS
NERVOUS/ANXIOUS: 0
DIARRHEA: 0
TREMORS: 0
NAUSEA: 1
ABDOMINAL PAIN: 0
SHORTNESS OF BREATH: 0
CONSTIPATION: 0
WEAKNESS: 1

## 2025-08-23 ASSESSMENT — PATIENT HEALTH QUESTIONNAIRE - PHQ9
2. FEELING DOWN, DEPRESSED, IRRITABLE, OR HOPELESS: NOT AT ALL
2. FEELING DOWN, DEPRESSED, IRRITABLE, OR HOPELESS: NOT AT ALL
1. LITTLE INTEREST OR PLEASURE IN DOING THINGS: NOT AT ALL
SUM OF ALL RESPONSES TO PHQ9 QUESTIONS 1 AND 2: 0
SUM OF ALL RESPONSES TO PHQ9 QUESTIONS 1 AND 2: 0
1. LITTLE INTEREST OR PLEASURE IN DOING THINGS: NOT AT ALL

## 2025-08-24 LAB
ALBUMIN SERPL BCP-MCNC: 1.9 G/DL (ref 3.2–4.9)
ALBUMIN/GLOB SERPL: 0.5 G/DL
ALP SERPL-CCNC: 44 U/L (ref 30–99)
ALT SERPL-CCNC: 8 U/L (ref 2–50)
ANION GAP SERPL CALC-SCNC: 9 MMOL/L (ref 7–16)
AST SERPL-CCNC: 30 U/L (ref 12–45)
BACTERIA FLD AEROBE CULT: NORMAL
BILIRUB SERPL-MCNC: 2.8 MG/DL (ref 0.1–1.5)
BUN SERPL-MCNC: 8 MG/DL (ref 8–22)
CALCIUM ALBUM COR SERPL-MCNC: 9.4 MG/DL (ref 8.5–10.5)
CALCIUM SERPL-MCNC: 7.7 MG/DL (ref 8.5–10.5)
CHLORIDE SERPL-SCNC: 101 MMOL/L (ref 96–112)
CO2 SERPL-SCNC: 19 MMOL/L (ref 20–33)
CREAT SERPL-MCNC: 0.62 MG/DL (ref 0.5–1.4)
ERYTHROCYTE [DISTWIDTH] IN BLOOD BY AUTOMATED COUNT: 55.8 FL (ref 35.9–50)
GFR SERPLBLD CREATININE-BSD FMLA CKD-EPI: 100 ML/MIN/1.73 M 2
GLOBULIN SER CALC-MCNC: 4.2 G/DL (ref 1.9–3.5)
GLUCOSE SERPL-MCNC: 88 MG/DL (ref 65–99)
GRAM STN SPEC: NORMAL
HCT VFR BLD AUTO: 26.8 % (ref 37–47)
HGB BLD-MCNC: 9.3 G/DL (ref 12–16)
INR PPP: 2.25 (ref 0.87–1.13)
MCH RBC QN AUTO: 35.1 PG (ref 27–33)
MCHC RBC AUTO-ENTMCNC: 34.7 G/DL (ref 32.2–35.5)
MCV RBC AUTO: 101.1 FL (ref 81.4–97.8)
PLATELET # BLD AUTO: 96 K/UL (ref 164–446)
PLATELETS.RETICULATED NFR BLD AUTO: 1.6 % (ref 0.6–13.1)
PMV BLD AUTO: 9.8 FL (ref 9–12.9)
POTASSIUM SERPL-SCNC: 3.5 MMOL/L (ref 3.6–5.5)
PROT SERPL-MCNC: 6.1 G/DL (ref 6–8.2)
PROTHROMBIN TIME: 25 SEC (ref 12–14.6)
RBC # BLD AUTO: 2.65 M/UL (ref 4.2–5.4)
SIGNIFICANT IND 70042: NORMAL
SITE SITE: NORMAL
SODIUM SERPL-SCNC: 129 MMOL/L (ref 135–145)
SOURCE SOURCE: NORMAL
WBC # BLD AUTO: 3.8 K/UL (ref 4.8–10.8)

## 2025-08-24 PROCEDURE — A9270 NON-COVERED ITEM OR SERVICE: HCPCS

## 2025-08-24 PROCEDURE — 80053 COMPREHEN METABOLIC PANEL: CPT

## 2025-08-24 PROCEDURE — 97162 PT EVAL MOD COMPLEX 30 MIN: CPT

## 2025-08-24 PROCEDURE — 700111 HCHG RX REV CODE 636 W/ 250 OVERRIDE (IP): Mod: JZ | Performed by: STUDENT IN AN ORGANIZED HEALTH CARE EDUCATION/TRAINING PROGRAM

## 2025-08-24 PROCEDURE — 97535 SELF CARE MNGMENT TRAINING: CPT

## 2025-08-24 PROCEDURE — 700111 HCHG RX REV CODE 636 W/ 250 OVERRIDE (IP): Mod: JZ | Performed by: INTERNAL MEDICINE

## 2025-08-24 PROCEDURE — 85027 COMPLETE CBC AUTOMATED: CPT

## 2025-08-24 PROCEDURE — 700102 HCHG RX REV CODE 250 W/ 637 OVERRIDE(OP): Performed by: INTERNAL MEDICINE

## 2025-08-24 PROCEDURE — 770006 HCHG ROOM/CARE - MED/SURG/GYN SEMI*

## 2025-08-24 PROCEDURE — 700105 HCHG RX REV CODE 258: Performed by: STUDENT IN AN ORGANIZED HEALTH CARE EDUCATION/TRAINING PROGRAM

## 2025-08-24 PROCEDURE — 700102 HCHG RX REV CODE 250 W/ 637 OVERRIDE(OP)

## 2025-08-24 PROCEDURE — 36415 COLL VENOUS BLD VENIPUNCTURE: CPT

## 2025-08-24 PROCEDURE — 85055 RETICULATED PLATELET ASSAY: CPT

## 2025-08-24 PROCEDURE — 700102 HCHG RX REV CODE 250 W/ 637 OVERRIDE(OP): Performed by: STUDENT IN AN ORGANIZED HEALTH CARE EDUCATION/TRAINING PROGRAM

## 2025-08-24 PROCEDURE — A9270 NON-COVERED ITEM OR SERVICE: HCPCS | Performed by: STUDENT IN AN ORGANIZED HEALTH CARE EDUCATION/TRAINING PROGRAM

## 2025-08-24 PROCEDURE — A9270 NON-COVERED ITEM OR SERVICE: HCPCS | Performed by: INTERNAL MEDICINE

## 2025-08-24 PROCEDURE — 85610 PROTHROMBIN TIME: CPT

## 2025-08-24 RX ORDER — DIAZEPAM 10 MG/2ML
10 INJECTION, SOLUTION INTRAMUSCULAR; INTRAVENOUS
Status: DISCONTINUED | OUTPATIENT
Start: 2025-08-24 | End: 2025-08-25

## 2025-08-24 RX ORDER — LORAZEPAM 0.5 MG/1
0.5 TABLET ORAL EVERY 4 HOURS PRN
Status: DISCONTINUED | OUTPATIENT
Start: 2025-08-24 | End: 2025-08-25

## 2025-08-24 RX ORDER — POTASSIUM CHLORIDE 1500 MG/1
20 TABLET, EXTENDED RELEASE ORAL ONCE
Status: COMPLETED | OUTPATIENT
Start: 2025-08-24 | End: 2025-08-24

## 2025-08-24 RX ORDER — LORAZEPAM 1 MG/1
3 TABLET ORAL
Status: DISCONTINUED | OUTPATIENT
Start: 2025-08-24 | End: 2025-08-25

## 2025-08-24 RX ORDER — LORAZEPAM 1 MG/1
2 TABLET ORAL
Status: DISCONTINUED | OUTPATIENT
Start: 2025-08-24 | End: 2025-08-25

## 2025-08-24 RX ORDER — LORAZEPAM 1 MG/1
4 TABLET ORAL
Status: DISCONTINUED | OUTPATIENT
Start: 2025-08-24 | End: 2025-08-25

## 2025-08-24 RX ORDER — LORAZEPAM 1 MG/1
1 TABLET ORAL EVERY 4 HOURS PRN
Status: DISCONTINUED | OUTPATIENT
Start: 2025-08-24 | End: 2025-08-25

## 2025-08-24 RX ADMIN — THERA TABS 1 TABLET: TAB at 05:44

## 2025-08-24 RX ADMIN — OMEPRAZOLE 20 MG: 20 CAPSULE, DELAYED RELEASE ORAL at 05:45

## 2025-08-24 RX ADMIN — CEFAZOLIN 2 G: 2 INJECTION, POWDER, FOR SOLUTION INTRAVENOUS at 20:38

## 2025-08-24 RX ADMIN — ATORVASTATIN CALCIUM 10 MG: 10 TABLET, FILM COATED ORAL at 05:45

## 2025-08-24 RX ADMIN — CEFAZOLIN 2 G: 2 INJECTION, POWDER, FOR SOLUTION INTRAVENOUS at 13:28

## 2025-08-24 RX ADMIN — SENNOSIDES, DOCUSATE SODIUM 2 TABLET: 50; 8.6 TABLET, FILM COATED ORAL at 16:53

## 2025-08-24 RX ADMIN — SUCRALFATE 1 G: 1 TABLET ORAL at 16:53

## 2025-08-24 RX ADMIN — Medication 100 MG: at 05:44

## 2025-08-24 RX ADMIN — FOLIC ACID 1 MG: 1 TABLET ORAL at 05:44

## 2025-08-24 RX ADMIN — SUCRALFATE 1 G: 1 TABLET ORAL at 08:19

## 2025-08-24 RX ADMIN — CEFAZOLIN 2 G: 2 INJECTION, POWDER, FOR SOLUTION INTRAVENOUS at 05:55

## 2025-08-24 RX ADMIN — ESCITALOPRAM OXALATE 10 MG: 10 TABLET ORAL at 05:45

## 2025-08-24 RX ADMIN — SUCRALFATE 1 G: 1 TABLET ORAL at 11:50

## 2025-08-24 RX ADMIN — SPIRONOLACTONE 100 MG: 100 TABLET ORAL at 05:45

## 2025-08-24 RX ADMIN — TAMSULOSIN HYDROCHLORIDE 0.4 MG: 0.4 CAPSULE ORAL at 08:19

## 2025-08-24 RX ADMIN — FUROSEMIDE 40 MG: 10 INJECTION, SOLUTION INTRAVENOUS at 16:53

## 2025-08-24 RX ADMIN — SUCRALFATE 1 G: 1 TABLET ORAL at 20:33

## 2025-08-24 RX ADMIN — POTASSIUM CHLORIDE 20 MEQ: 1500 TABLET, EXTENDED RELEASE ORAL at 08:19

## 2025-08-24 RX ADMIN — FUROSEMIDE 40 MG: 10 INJECTION, SOLUTION INTRAVENOUS at 05:46

## 2025-08-24 ASSESSMENT — COGNITIVE AND FUNCTIONAL STATUS - GENERAL
MOBILITY SCORE: 19
CLIMB 3 TO 5 STEPS WITH RAILING: A LOT
SUGGESTED CMS G CODE MODIFIER MOBILITY: CK
STANDING UP FROM CHAIR USING ARMS: A LITTLE
MOVING TO AND FROM BED TO CHAIR: A LITTLE
WALKING IN HOSPITAL ROOM: A LITTLE

## 2025-08-24 ASSESSMENT — LIFESTYLE VARIABLES
HEADACHE, FULLNESS IN HEAD: NOT PRESENT
AGITATION: NORMAL ACTIVITY
AGITATION: SOMEWHAT MORE THAN NORMAL ACTIVITY
AUDITORY DISTURBANCES: NOT PRESENT
PAROXYSMAL SWEATS: *
VISUAL DISTURBANCES: NOT PRESENT
HEADACHE, FULLNESS IN HEAD: NOT PRESENT
ORIENTATION AND CLOUDING OF SENSORIUM: ORIENTED AND CAN DO SERIAL ADDITIONS
AUDITORY DISTURBANCES: NOT PRESENT
PAROXYSMAL SWEATS: *
ANXIETY: NO ANXIETY (AT EASE)
HEADACHE, FULLNESS IN HEAD: NOT PRESENT
TREMOR: TREMOR NOT VISIBLE BUT CAN BE FELT, FINGERTIP TO FINGERTIP
AUDITORY DISTURBANCES: NOT PRESENT
PAROXYSMAL SWEATS: *
VISUAL DISTURBANCES: NOT PRESENT
VISUAL DISTURBANCES: NOT PRESENT
HEADACHE, FULLNESS IN HEAD: NOT PRESENT
TOTAL SCORE: 4
ORIENTATION AND CLOUDING OF SENSORIUM: ORIENTED AND CAN DO SERIAL ADDITIONS
AGITATION: NORMAL ACTIVITY
NAUSEA AND VOMITING: NO NAUSEA AND NO VOMITING
ORIENTATION AND CLOUDING OF SENSORIUM: ORIENTED AND CAN DO SERIAL ADDITIONS
AUDITORY DISTURBANCES: NOT PRESENT
TREMOR: *
VISUAL DISTURBANCES: NOT PRESENT
NAUSEA AND VOMITING: NO NAUSEA AND NO VOMITING
ANXIETY: NO ANXIETY (AT EASE)
AGITATION: NORMAL ACTIVITY
NAUSEA AND VOMITING: NO NAUSEA AND NO VOMITING
NAUSEA AND VOMITING: NO NAUSEA AND NO VOMITING
TREMOR: *
ANXIETY: NO ANXIETY (AT EASE)
TOTAL SCORE: 4
TREMOR: *
ORIENTATION AND CLOUDING OF SENSORIUM: ORIENTED AND CAN DO SERIAL ADDITIONS
TOTAL SCORE: 4
TOTAL SCORE: 4
ANXIETY: NO ANXIETY (AT EASE)
PAROXYSMAL SWEATS: *

## 2025-08-24 ASSESSMENT — GAIT ASSESSMENTS
DEVIATION: SHUFFLED GAIT;BRADYKINETIC;DECREASED HEEL STRIKE;DECREASED TOE OFF
GAIT LEVEL OF ASSIST: CONTACT GUARD ASSIST
ASSISTIVE DEVICE: FRONT WHEEL WALKER
DISTANCE (FEET): 60

## 2025-08-24 ASSESSMENT — ENCOUNTER SYMPTOMS
NERVOUS/ANXIOUS: 0
ABDOMINAL PAIN: 1
CONSTIPATION: 0
NAUSEA: 1
HEADACHES: 0
DIARRHEA: 0
WEAKNESS: 1
SHORTNESS OF BREATH: 1
TREMORS: 1

## 2025-08-24 ASSESSMENT — PATIENT HEALTH QUESTIONNAIRE - PHQ9
2. FEELING DOWN, DEPRESSED, IRRITABLE, OR HOPELESS: NOT AT ALL
1. LITTLE INTEREST OR PLEASURE IN DOING THINGS: NOT AT ALL
SUM OF ALL RESPONSES TO PHQ9 QUESTIONS 1 AND 2: 0

## 2025-08-24 ASSESSMENT — PAIN DESCRIPTION - PAIN TYPE: TYPE: ACUTE PAIN

## 2025-08-25 LAB
ALBUMIN SERPL BCP-MCNC: 1.9 G/DL (ref 3.2–4.9)
ALBUMIN/GLOB SERPL: 0.5 G/DL
ALP SERPL-CCNC: 45 U/L (ref 30–99)
ALT SERPL-CCNC: 6 U/L (ref 2–50)
ANION GAP SERPL CALC-SCNC: 9 MMOL/L (ref 7–16)
AST SERPL-CCNC: 32 U/L (ref 12–45)
BILIRUB SERPL-MCNC: 2.3 MG/DL (ref 0.1–1.5)
BUN SERPL-MCNC: 8 MG/DL (ref 8–22)
CALCIUM ALBUM COR SERPL-MCNC: 9.2 MG/DL (ref 8.5–10.5)
CALCIUM SERPL-MCNC: 7.5 MG/DL (ref 8.5–10.5)
CHLORIDE SERPL-SCNC: 99 MMOL/L (ref 96–112)
CO2 SERPL-SCNC: 22 MMOL/L (ref 20–33)
CREAT SERPL-MCNC: 0.68 MG/DL (ref 0.5–1.4)
ERYTHROCYTE [DISTWIDTH] IN BLOOD BY AUTOMATED COUNT: 56.7 FL (ref 35.9–50)
GFR SERPLBLD CREATININE-BSD FMLA CKD-EPI: 98 ML/MIN/1.73 M 2
GLOBULIN SER CALC-MCNC: 4.1 G/DL (ref 1.9–3.5)
GLUCOSE SERPL-MCNC: 90 MG/DL (ref 65–99)
HCT VFR BLD AUTO: 25.8 % (ref 37–47)
HGB BLD-MCNC: 9 G/DL (ref 12–16)
INR PPP: 2.24 (ref 0.87–1.13)
MCH RBC QN AUTO: 35.6 PG (ref 27–33)
MCHC RBC AUTO-ENTMCNC: 34.9 G/DL (ref 32.2–35.5)
MCV RBC AUTO: 102 FL (ref 81.4–97.8)
PLATELET # BLD AUTO: 93 K/UL (ref 164–446)
PLATELETS.RETICULATED NFR BLD AUTO: 1.8 % (ref 0.6–13.1)
PMV BLD AUTO: 9.6 FL (ref 9–12.9)
POTASSIUM SERPL-SCNC: 3 MMOL/L (ref 3.6–5.5)
PROT SERPL-MCNC: 6 G/DL (ref 6–8.2)
PROTHROMBIN TIME: 25 SEC (ref 12–14.6)
RBC # BLD AUTO: 2.53 M/UL (ref 4.2–5.4)
SODIUM SERPL-SCNC: 130 MMOL/L (ref 135–145)
WBC # BLD AUTO: 3.3 K/UL (ref 4.8–10.8)

## 2025-08-25 PROCEDURE — 700105 HCHG RX REV CODE 258: Performed by: STUDENT IN AN ORGANIZED HEALTH CARE EDUCATION/TRAINING PROGRAM

## 2025-08-25 PROCEDURE — 700102 HCHG RX REV CODE 250 W/ 637 OVERRIDE(OP): Performed by: STUDENT IN AN ORGANIZED HEALTH CARE EDUCATION/TRAINING PROGRAM

## 2025-08-25 PROCEDURE — 770006 HCHG ROOM/CARE - MED/SURG/GYN SEMI*

## 2025-08-25 PROCEDURE — 83735 ASSAY OF MAGNESIUM: CPT

## 2025-08-25 PROCEDURE — 700111 HCHG RX REV CODE 636 W/ 250 OVERRIDE (IP): Mod: JZ | Performed by: INTERNAL MEDICINE

## 2025-08-25 PROCEDURE — 85027 COMPLETE CBC AUTOMATED: CPT

## 2025-08-25 PROCEDURE — 700111 HCHG RX REV CODE 636 W/ 250 OVERRIDE (IP): Mod: JZ | Performed by: STUDENT IN AN ORGANIZED HEALTH CARE EDUCATION/TRAINING PROGRAM

## 2025-08-25 PROCEDURE — 80053 COMPREHEN METABOLIC PANEL: CPT

## 2025-08-25 PROCEDURE — 36415 COLL VENOUS BLD VENIPUNCTURE: CPT

## 2025-08-25 PROCEDURE — 700102 HCHG RX REV CODE 250 W/ 637 OVERRIDE(OP): Performed by: INTERNAL MEDICINE

## 2025-08-25 PROCEDURE — A9270 NON-COVERED ITEM OR SERVICE: HCPCS | Performed by: STUDENT IN AN ORGANIZED HEALTH CARE EDUCATION/TRAINING PROGRAM

## 2025-08-25 PROCEDURE — 99406 BEHAV CHNG SMOKING 3-10 MIN: CPT

## 2025-08-25 PROCEDURE — 85610 PROTHROMBIN TIME: CPT

## 2025-08-25 PROCEDURE — 85055 RETICULATED PLATELET ASSAY: CPT

## 2025-08-25 PROCEDURE — 84100 ASSAY OF PHOSPHORUS: CPT

## 2025-08-25 PROCEDURE — A9270 NON-COVERED ITEM OR SERVICE: HCPCS | Performed by: INTERNAL MEDICINE

## 2025-08-25 RX ORDER — POTASSIUM CHLORIDE 1500 MG/1
40 TABLET, EXTENDED RELEASE ORAL 2 TIMES DAILY
Status: COMPLETED | OUTPATIENT
Start: 2025-08-25 | End: 2025-08-25

## 2025-08-25 RX ORDER — SPIRONOLACTONE 50 MG/1
50 TABLET, FILM COATED ORAL
Status: DISCONTINUED | OUTPATIENT
Start: 2025-08-26 | End: 2025-08-26

## 2025-08-25 RX ADMIN — ATORVASTATIN CALCIUM 10 MG: 10 TABLET, FILM COATED ORAL at 04:36

## 2025-08-25 RX ADMIN — SUCRALFATE 1 G: 1 TABLET ORAL at 12:56

## 2025-08-25 RX ADMIN — SUCRALFATE 1 G: 1 TABLET ORAL at 08:28

## 2025-08-25 RX ADMIN — FUROSEMIDE 40 MG: 10 INJECTION, SOLUTION INTRAVENOUS at 17:06

## 2025-08-25 RX ADMIN — FOLIC ACID 1 MG: 1 TABLET ORAL at 04:36

## 2025-08-25 RX ADMIN — SUCRALFATE 1 G: 1 TABLET ORAL at 17:06

## 2025-08-25 RX ADMIN — ESCITALOPRAM OXALATE 10 MG: 10 TABLET ORAL at 04:36

## 2025-08-25 RX ADMIN — TAMSULOSIN HYDROCHLORIDE 0.4 MG: 0.4 CAPSULE ORAL at 08:28

## 2025-08-25 RX ADMIN — Medication 100 MG: at 04:36

## 2025-08-25 RX ADMIN — CEFAZOLIN 2 G: 2 INJECTION, POWDER, FOR SOLUTION INTRAVENOUS at 14:51

## 2025-08-25 RX ADMIN — SUCRALFATE 1 G: 1 TABLET ORAL at 21:05

## 2025-08-25 RX ADMIN — OMEPRAZOLE 20 MG: 20 CAPSULE, DELAYED RELEASE ORAL at 04:36

## 2025-08-25 RX ADMIN — POTASSIUM CHLORIDE 40 MEQ: 1500 TABLET, EXTENDED RELEASE ORAL at 17:05

## 2025-08-25 RX ADMIN — POTASSIUM CHLORIDE 40 MEQ: 1500 TABLET, EXTENDED RELEASE ORAL at 08:28

## 2025-08-25 RX ADMIN — CEFAZOLIN 2 G: 2 INJECTION, POWDER, FOR SOLUTION INTRAVENOUS at 04:39

## 2025-08-25 RX ADMIN — THERA TABS 1 TABLET: TAB at 04:36

## 2025-08-25 ASSESSMENT — ENCOUNTER SYMPTOMS
SHORTNESS OF BREATH: 1
NAUSEA: 0
TREMORS: 0
CONSTIPATION: 0
DIARRHEA: 0
WEAKNESS: 1
ABDOMINAL PAIN: 1
NERVOUS/ANXIOUS: 0
HEADACHES: 0

## 2025-08-25 ASSESSMENT — LIFESTYLE VARIABLES
AUDITORY DISTURBANCES: NOT PRESENT
AGITATION: SOMEWHAT MORE THAN NORMAL ACTIVITY
VISUAL DISTURBANCES: NOT PRESENT
AUDITORY DISTURBANCES: NOT PRESENT
PAROXYSMAL SWEATS: NO SWEAT VISIBLE
HEADACHE, FULLNESS IN HEAD: NOT PRESENT
TREMOR: NO TREMOR
TOTAL SCORE: 0
ORIENTATION AND CLOUDING OF SENSORIUM: ORIENTED AND CAN DO SERIAL ADDITIONS
TOTAL SCORE: 2
AGITATION: SOMEWHAT MORE THAN NORMAL ACTIVITY
ANXIETY: NO ANXIETY (AT EASE)
NAUSEA AND VOMITING: NO NAUSEA AND NO VOMITING
VISUAL DISTURBANCES: NOT PRESENT
HEADACHE, FULLNESS IN HEAD: NOT PRESENT
TREMOR: TREMOR NOT VISIBLE BUT CAN BE FELT, FINGERTIP TO FINGERTIP
TREMOR: TREMOR NOT VISIBLE BUT CAN BE FELT, FINGERTIP TO FINGERTIP
HEADACHE, FULLNESS IN HEAD: NOT PRESENT
NAUSEA AND VOMITING: NO NAUSEA AND NO VOMITING
ANXIETY: NO ANXIETY (AT EASE)
AGITATION: NORMAL ACTIVITY
VISUAL DISTURBANCES: NOT PRESENT
ORIENTATION AND CLOUDING OF SENSORIUM: ORIENTED AND CAN DO SERIAL ADDITIONS
PAROXYSMAL SWEATS: NO SWEAT VISIBLE
AUDITORY DISTURBANCES: NOT PRESENT
ANXIETY: NO ANXIETY (AT EASE)
TOTAL SCORE: 2
PAROXYSMAL SWEATS: NO SWEAT VISIBLE
ORIENTATION AND CLOUDING OF SENSORIUM: ORIENTED AND CAN DO SERIAL ADDITIONS
NAUSEA AND VOMITING: NO NAUSEA AND NO VOMITING

## 2025-08-25 ASSESSMENT — PAIN DESCRIPTION - PAIN TYPE: TYPE: ACUTE PAIN

## 2025-08-26 PROBLEM — E87.6 HYPOKALEMIA: Status: ACTIVE | Noted: 2025-08-26

## 2025-08-26 PROBLEM — E83.42 HYPOMAGNESEMIA: Status: ACTIVE | Noted: 2025-08-26

## 2025-08-26 LAB
ALBUMIN SERPL BCP-MCNC: 2 G/DL (ref 3.2–4.9)
ALBUMIN/GLOB SERPL: 0.5 G/DL
ALP SERPL-CCNC: 45 U/L (ref 30–99)
ALT SERPL-CCNC: 6 U/L (ref 2–50)
ANION GAP SERPL CALC-SCNC: 7 MMOL/L (ref 7–16)
AST SERPL-CCNC: 37 U/L (ref 12–45)
BILIRUB SERPL-MCNC: 2.8 MG/DL (ref 0.1–1.5)
BUN SERPL-MCNC: 7 MG/DL (ref 8–22)
CALCIUM ALBUM COR SERPL-MCNC: 9.3 MG/DL (ref 8.5–10.5)
CALCIUM SERPL-MCNC: 7.7 MG/DL (ref 8.5–10.5)
CHLORIDE SERPL-SCNC: 101 MMOL/L (ref 96–112)
CO2 SERPL-SCNC: 22 MMOL/L (ref 20–33)
CREAT SERPL-MCNC: 0.58 MG/DL (ref 0.5–1.4)
ERYTHROCYTE [DISTWIDTH] IN BLOOD BY AUTOMATED COUNT: 58.1 FL (ref 35.9–50)
GFR SERPLBLD CREATININE-BSD FMLA CKD-EPI: 102 ML/MIN/1.73 M 2
GLOBULIN SER CALC-MCNC: 4.2 G/DL (ref 1.9–3.5)
GLUCOSE SERPL-MCNC: 81 MG/DL (ref 65–99)
HCT VFR BLD AUTO: 27.4 % (ref 37–47)
HGB BLD-MCNC: 9.1 G/DL (ref 12–16)
INR PPP: 2.29 (ref 0.87–1.13)
MAGNESIUM SERPL-MCNC: 1.4 MG/DL (ref 1.5–2.5)
MAGNESIUM SERPL-MCNC: 1.5 MG/DL (ref 1.5–2.5)
MCH RBC QN AUTO: 34.5 PG (ref 27–33)
MCHC RBC AUTO-ENTMCNC: 33.2 G/DL (ref 32.2–35.5)
MCV RBC AUTO: 103.8 FL (ref 81.4–97.8)
PHOSPHATE SERPL-MCNC: 2.5 MG/DL (ref 2.5–4.5)
PHOSPHATE SERPL-MCNC: 2.8 MG/DL (ref 2.5–4.5)
PLATELET # BLD AUTO: 90 K/UL (ref 164–446)
PLATELETS.RETICULATED NFR BLD AUTO: 2.5 % (ref 0.6–13.1)
PMV BLD AUTO: 9.6 FL (ref 9–12.9)
POTASSIUM SERPL-SCNC: 3 MMOL/L (ref 3.6–5.5)
PROT SERPL-MCNC: 6.2 G/DL (ref 6–8.2)
PROTHROMBIN TIME: 25.4 SEC (ref 12–14.6)
RBC # BLD AUTO: 2.64 M/UL (ref 4.2–5.4)
SODIUM SERPL-SCNC: 130 MMOL/L (ref 135–145)
WBC # BLD AUTO: 2.6 K/UL (ref 4.8–10.8)

## 2025-08-26 PROCEDURE — A9270 NON-COVERED ITEM OR SERVICE: HCPCS | Performed by: INTERNAL MEDICINE

## 2025-08-26 PROCEDURE — 80053 COMPREHEN METABOLIC PANEL: CPT

## 2025-08-26 PROCEDURE — 700102 HCHG RX REV CODE 250 W/ 637 OVERRIDE(OP): Performed by: INTERNAL MEDICINE

## 2025-08-26 PROCEDURE — 85055 RETICULATED PLATELET ASSAY: CPT

## 2025-08-26 PROCEDURE — 700111 HCHG RX REV CODE 636 W/ 250 OVERRIDE (IP): Mod: JZ | Performed by: INTERNAL MEDICINE

## 2025-08-26 PROCEDURE — A9270 NON-COVERED ITEM OR SERVICE: HCPCS

## 2025-08-26 PROCEDURE — 700111 HCHG RX REV CODE 636 W/ 250 OVERRIDE (IP)

## 2025-08-26 PROCEDURE — 85610 PROTHROMBIN TIME: CPT

## 2025-08-26 PROCEDURE — 84100 ASSAY OF PHOSPHORUS: CPT

## 2025-08-26 PROCEDURE — A9270 NON-COVERED ITEM OR SERVICE: HCPCS | Performed by: STUDENT IN AN ORGANIZED HEALTH CARE EDUCATION/TRAINING PROGRAM

## 2025-08-26 PROCEDURE — 97166 OT EVAL MOD COMPLEX 45 MIN: CPT

## 2025-08-26 PROCEDURE — 770006 HCHG ROOM/CARE - MED/SURG/GYN SEMI*

## 2025-08-26 PROCEDURE — 85027 COMPLETE CBC AUTOMATED: CPT

## 2025-08-26 PROCEDURE — 700102 HCHG RX REV CODE 250 W/ 637 OVERRIDE(OP)

## 2025-08-26 PROCEDURE — 83735 ASSAY OF MAGNESIUM: CPT

## 2025-08-26 PROCEDURE — 700102 HCHG RX REV CODE 250 W/ 637 OVERRIDE(OP): Performed by: STUDENT IN AN ORGANIZED HEALTH CARE EDUCATION/TRAINING PROGRAM

## 2025-08-26 PROCEDURE — 36415 COLL VENOUS BLD VENIPUNCTURE: CPT

## 2025-08-26 RX ORDER — POTASSIUM CHLORIDE 1500 MG/1
40 TABLET, EXTENDED RELEASE ORAL 2 TIMES DAILY
Status: DISCONTINUED | OUTPATIENT
Start: 2025-08-26 | End: 2025-08-29

## 2025-08-26 RX ORDER — SPIRONOLACTONE 100 MG/1
100 TABLET, FILM COATED ORAL
Status: DISCONTINUED | OUTPATIENT
Start: 2025-08-27 | End: 2025-08-28

## 2025-08-26 RX ORDER — POTASSIUM CHLORIDE 1500 MG/1
40 TABLET, EXTENDED RELEASE ORAL ONCE
Status: DISCONTINUED | OUTPATIENT
Start: 2025-08-26 | End: 2025-08-26

## 2025-08-26 RX ORDER — MAGNESIUM SULFATE HEPTAHYDRATE 40 MG/ML
2 INJECTION, SOLUTION INTRAVENOUS ONCE
Status: COMPLETED | OUTPATIENT
Start: 2025-08-26 | End: 2025-08-26

## 2025-08-26 RX ORDER — FUROSEMIDE 40 MG/1
40 TABLET ORAL
Status: DISCONTINUED | OUTPATIENT
Start: 2025-08-27 | End: 2025-08-28

## 2025-08-26 RX ADMIN — SPIRONOLACTONE 50 MG: 50 TABLET ORAL at 05:06

## 2025-08-26 RX ADMIN — TAMSULOSIN HYDROCHLORIDE 0.4 MG: 0.4 CAPSULE ORAL at 09:24

## 2025-08-26 RX ADMIN — SUCRALFATE 1 G: 1 TABLET ORAL at 17:21

## 2025-08-26 RX ADMIN — SENNOSIDES, DOCUSATE SODIUM 2 TABLET: 50; 8.6 TABLET, FILM COATED ORAL at 17:21

## 2025-08-26 RX ADMIN — FUROSEMIDE 40 MG: 10 INJECTION, SOLUTION INTRAVENOUS at 05:06

## 2025-08-26 RX ADMIN — SUCRALFATE 1 G: 1 TABLET ORAL at 05:06

## 2025-08-26 RX ADMIN — MAGNESIUM SULFATE HEPTAHYDRATE 2 G: 2 INJECTION, SOLUTION INTRAVENOUS at 17:20

## 2025-08-26 RX ADMIN — Medication 100 MG: at 05:06

## 2025-08-26 RX ADMIN — OMEPRAZOLE 20 MG: 20 CAPSULE, DELAYED RELEASE ORAL at 05:06

## 2025-08-26 RX ADMIN — SUCRALFATE 1 G: 1 TABLET ORAL at 13:23

## 2025-08-26 RX ADMIN — THERA TABS 1 TABLET: TAB at 05:06

## 2025-08-26 RX ADMIN — ESCITALOPRAM OXALATE 10 MG: 10 TABLET ORAL at 05:06

## 2025-08-26 RX ADMIN — POTASSIUM CHLORIDE 40 MEQ: 1500 TABLET, EXTENDED RELEASE ORAL at 17:21

## 2025-08-26 RX ADMIN — ATORVASTATIN CALCIUM 10 MG: 10 TABLET, FILM COATED ORAL at 05:06

## 2025-08-26 RX ADMIN — FOLIC ACID 1 MG: 1 TABLET ORAL at 05:06

## 2025-08-26 RX ADMIN — SUCRALFATE 1 G: 1 TABLET ORAL at 20:27

## 2025-08-26 ASSESSMENT — ENCOUNTER SYMPTOMS
SHORTNESS OF BREATH: 1
TREMORS: 0
WEAKNESS: 1
CONSTIPATION: 0
DIARRHEA: 0
HEADACHES: 0
NAUSEA: 0
ABDOMINAL PAIN: 1
NERVOUS/ANXIOUS: 0

## 2025-08-26 ASSESSMENT — COGNITIVE AND FUNCTIONAL STATUS - GENERAL
DRESSING REGULAR LOWER BODY CLOTHING: A LOT
DAILY ACTIVITIY SCORE: 18
SUGGESTED CMS G CODE MODIFIER DAILY ACTIVITY: CK
HELP NEEDED FOR BATHING: A LITTLE
TOILETING: A LITTLE
DRESSING REGULAR UPPER BODY CLOTHING: A LITTLE
PERSONAL GROOMING: A LITTLE

## 2025-08-26 ASSESSMENT — PAIN DESCRIPTION - PAIN TYPE: TYPE: ACUTE PAIN

## 2025-08-26 ASSESSMENT — ACTIVITIES OF DAILY LIVING (ADL): TOILETING: INDEPENDENT

## 2025-08-27 PROBLEM — E83.39 HYPOPHOSPHATEMIA: Status: ACTIVE | Noted: 2025-08-27

## 2025-08-27 PROBLEM — D61.818 PANCYTOPENIA (HCC): Status: ACTIVE | Noted: 2025-08-27

## 2025-08-27 LAB
ANION GAP SERPL CALC-SCNC: 5 MMOL/L (ref 7–16)
BUN SERPL-MCNC: 7 MG/DL (ref 8–22)
CALCIUM SERPL-MCNC: 7.5 MG/DL (ref 8.5–10.5)
CHLORIDE SERPL-SCNC: 101 MMOL/L (ref 96–112)
CO2 SERPL-SCNC: 22 MMOL/L (ref 20–33)
CREAT SERPL-MCNC: 0.57 MG/DL (ref 0.5–1.4)
ERYTHROCYTE [DISTWIDTH] IN BLOOD BY AUTOMATED COUNT: 55.9 FL (ref 35.9–50)
GFR SERPLBLD CREATININE-BSD FMLA CKD-EPI: 102 ML/MIN/1.73 M 2
GLUCOSE SERPL-MCNC: 87 MG/DL (ref 65–99)
HCT VFR BLD AUTO: 26.4 % (ref 37–47)
HGB BLD-MCNC: 8.9 G/DL (ref 12–16)
MAGNESIUM SERPL-MCNC: 1.9 MG/DL (ref 1.5–2.5)
MCH RBC QN AUTO: 34.6 PG (ref 27–33)
MCHC RBC AUTO-ENTMCNC: 33.7 G/DL (ref 32.2–35.5)
MCV RBC AUTO: 102.7 FL (ref 81.4–97.8)
PHOSPHATE SERPL-MCNC: 2.2 MG/DL (ref 2.5–4.5)
PLATELET # BLD AUTO: 93 K/UL (ref 164–446)
PLATELETS.RETICULATED NFR BLD AUTO: 2.1 % (ref 0.6–13.1)
PMV BLD AUTO: 9.9 FL (ref 9–12.9)
POTASSIUM SERPL-SCNC: 3.7 MMOL/L (ref 3.6–5.5)
RBC # BLD AUTO: 2.57 M/UL (ref 4.2–5.4)
SODIUM SERPL-SCNC: 128 MMOL/L (ref 135–145)
WBC # BLD AUTO: 3.1 K/UL (ref 4.8–10.8)

## 2025-08-27 PROCEDURE — A9270 NON-COVERED ITEM OR SERVICE: HCPCS

## 2025-08-27 PROCEDURE — 700102 HCHG RX REV CODE 250 W/ 637 OVERRIDE(OP)

## 2025-08-27 PROCEDURE — 80048 BASIC METABOLIC PNL TOTAL CA: CPT

## 2025-08-27 PROCEDURE — 36415 COLL VENOUS BLD VENIPUNCTURE: CPT

## 2025-08-27 PROCEDURE — 97530 THERAPEUTIC ACTIVITIES: CPT

## 2025-08-27 PROCEDURE — 97116 GAIT TRAINING THERAPY: CPT

## 2025-08-27 PROCEDURE — A9270 NON-COVERED ITEM OR SERVICE: HCPCS | Performed by: INTERNAL MEDICINE

## 2025-08-27 PROCEDURE — 83735 ASSAY OF MAGNESIUM: CPT

## 2025-08-27 PROCEDURE — A9270 NON-COVERED ITEM OR SERVICE: HCPCS | Performed by: STUDENT IN AN ORGANIZED HEALTH CARE EDUCATION/TRAINING PROGRAM

## 2025-08-27 PROCEDURE — 85027 COMPLETE CBC AUTOMATED: CPT

## 2025-08-27 PROCEDURE — 770006 HCHG ROOM/CARE - MED/SURG/GYN SEMI*

## 2025-08-27 PROCEDURE — 700102 HCHG RX REV CODE 250 W/ 637 OVERRIDE(OP): Performed by: INTERNAL MEDICINE

## 2025-08-27 PROCEDURE — 85055 RETICULATED PLATELET ASSAY: CPT

## 2025-08-27 PROCEDURE — 700102 HCHG RX REV CODE 250 W/ 637 OVERRIDE(OP): Performed by: STUDENT IN AN ORGANIZED HEALTH CARE EDUCATION/TRAINING PROGRAM

## 2025-08-27 PROCEDURE — 84100 ASSAY OF PHOSPHORUS: CPT

## 2025-08-27 RX ADMIN — SUCRALFATE 1 G: 1 TABLET ORAL at 05:48

## 2025-08-27 RX ADMIN — SUCRALFATE 1 G: 1 TABLET ORAL at 22:32

## 2025-08-27 RX ADMIN — THERA TABS 1 TABLET: TAB at 05:48

## 2025-08-27 RX ADMIN — POTASSIUM CHLORIDE 40 MEQ: 1500 TABLET, EXTENDED RELEASE ORAL at 05:47

## 2025-08-27 RX ADMIN — FOLIC ACID 1 MG: 1 TABLET ORAL at 05:48

## 2025-08-27 RX ADMIN — ATORVASTATIN CALCIUM 10 MG: 10 TABLET, FILM COATED ORAL at 05:48

## 2025-08-27 RX ADMIN — SUCRALFATE 1 G: 1 TABLET ORAL at 10:59

## 2025-08-27 RX ADMIN — SPIRONOLACTONE 100 MG: 100 TABLET ORAL at 05:48

## 2025-08-27 RX ADMIN — TAMSULOSIN HYDROCHLORIDE 0.4 MG: 0.4 CAPSULE ORAL at 09:38

## 2025-08-27 RX ADMIN — SENNOSIDES, DOCUSATE SODIUM 2 TABLET: 50; 8.6 TABLET, FILM COATED ORAL at 17:25

## 2025-08-27 RX ADMIN — ESCITALOPRAM OXALATE 10 MG: 10 TABLET ORAL at 05:48

## 2025-08-27 RX ADMIN — SUCRALFATE 1 G: 1 TABLET ORAL at 17:25

## 2025-08-27 RX ADMIN — POTASSIUM CHLORIDE 40 MEQ: 1500 TABLET, EXTENDED RELEASE ORAL at 17:25

## 2025-08-27 RX ADMIN — Medication 100 MG: at 05:48

## 2025-08-27 RX ADMIN — OMEPRAZOLE 20 MG: 20 CAPSULE, DELAYED RELEASE ORAL at 05:47

## 2025-08-27 RX ADMIN — FUROSEMIDE 40 MG: 40 TABLET ORAL at 10:59

## 2025-08-27 RX ADMIN — Medication 500 MG: at 09:35

## 2025-08-27 ASSESSMENT — COGNITIVE AND FUNCTIONAL STATUS - GENERAL
CLIMB 3 TO 5 STEPS WITH RAILING: A LITTLE
MOBILITY SCORE: 23
SUGGESTED CMS G CODE MODIFIER MOBILITY: CI

## 2025-08-27 ASSESSMENT — GAIT ASSESSMENTS
ASSISTIVE DEVICE: FRONT WHEEL WALKER
DISTANCE (FEET): 200
GAIT LEVEL OF ASSIST: SUPERVISED

## 2025-08-27 ASSESSMENT — ENCOUNTER SYMPTOMS
DIARRHEA: 0
WEAKNESS: 1
CONSTIPATION: 0
NAUSEA: 0
NERVOUS/ANXIOUS: 0
ABDOMINAL PAIN: 1
SHORTNESS OF BREATH: 1
TREMORS: 0
HEADACHES: 0

## 2025-08-27 ASSESSMENT — PAIN DESCRIPTION - PAIN TYPE
TYPE: ACUTE PAIN

## 2025-08-28 LAB
ANION GAP SERPL CALC-SCNC: 6 MMOL/L (ref 7–16)
ANION GAP SERPL CALC-SCNC: 7 MMOL/L (ref 7–16)
BUN SERPL-MCNC: 8 MG/DL (ref 8–22)
BUN SERPL-MCNC: 8 MG/DL (ref 8–22)
CALCIUM SERPL-MCNC: 7.6 MG/DL (ref 8.5–10.5)
CALCIUM SERPL-MCNC: 8 MG/DL (ref 8.5–10.5)
CHLORIDE SERPL-SCNC: 100 MMOL/L (ref 96–112)
CHLORIDE SERPL-SCNC: 101 MMOL/L (ref 96–112)
CO2 SERPL-SCNC: 20 MMOL/L (ref 20–33)
CO2 SERPL-SCNC: 20 MMOL/L (ref 20–33)
CREAT SERPL-MCNC: 0.62 MG/DL (ref 0.5–1.4)
CREAT SERPL-MCNC: 0.68 MG/DL (ref 0.5–1.4)
ERYTHROCYTE [DISTWIDTH] IN BLOOD BY AUTOMATED COUNT: 56.6 FL (ref 35.9–50)
GFR SERPLBLD CREATININE-BSD FMLA CKD-EPI: 100 ML/MIN/1.73 M 2
GFR SERPLBLD CREATININE-BSD FMLA CKD-EPI: 98 ML/MIN/1.73 M 2
GLUCOSE SERPL-MCNC: 135 MG/DL (ref 65–99)
GLUCOSE SERPL-MCNC: 84 MG/DL (ref 65–99)
HCT VFR BLD AUTO: 27.9 % (ref 37–47)
HGB BLD-MCNC: 9.5 G/DL (ref 12–16)
MAGNESIUM SERPL-MCNC: 1.7 MG/DL (ref 1.5–2.5)
MCH RBC QN AUTO: 34.5 PG (ref 27–33)
MCHC RBC AUTO-ENTMCNC: 34.1 G/DL (ref 32.2–35.5)
MCV RBC AUTO: 101.5 FL (ref 81.4–97.8)
OSMOLALITY SERPL: 277 MOSM/KG H2O (ref 278–298)
PHOSPHATE SERPL-MCNC: 2.6 MG/DL (ref 2.5–4.5)
PLATELET # BLD AUTO: 99 K/UL (ref 164–446)
PLATELETS.RETICULATED NFR BLD AUTO: 2.5 % (ref 0.6–13.1)
PMV BLD AUTO: 10.1 FL (ref 9–12.9)
POTASSIUM SERPL-SCNC: 4.5 MMOL/L (ref 3.6–5.5)
POTASSIUM SERPL-SCNC: 4.6 MMOL/L (ref 3.6–5.5)
RBC # BLD AUTO: 2.75 M/UL (ref 4.2–5.4)
SODIUM SERPL-SCNC: 127 MMOL/L (ref 135–145)
SODIUM SERPL-SCNC: 127 MMOL/L (ref 135–145)
WBC # BLD AUTO: 3.2 K/UL (ref 4.8–10.8)

## 2025-08-28 PROCEDURE — 84100 ASSAY OF PHOSPHORUS: CPT

## 2025-08-28 PROCEDURE — A9270 NON-COVERED ITEM OR SERVICE: HCPCS

## 2025-08-28 PROCEDURE — 83735 ASSAY OF MAGNESIUM: CPT

## 2025-08-28 PROCEDURE — 85027 COMPLETE CBC AUTOMATED: CPT

## 2025-08-28 PROCEDURE — 770006 HCHG ROOM/CARE - MED/SURG/GYN SEMI*

## 2025-08-28 PROCEDURE — 700102 HCHG RX REV CODE 250 W/ 637 OVERRIDE(OP)

## 2025-08-28 PROCEDURE — 85055 RETICULATED PLATELET ASSAY: CPT

## 2025-08-28 PROCEDURE — 700102 HCHG RX REV CODE 250 W/ 637 OVERRIDE(OP): Performed by: STUDENT IN AN ORGANIZED HEALTH CARE EDUCATION/TRAINING PROGRAM

## 2025-08-28 PROCEDURE — 83930 ASSAY OF BLOOD OSMOLALITY: CPT

## 2025-08-28 PROCEDURE — 700102 HCHG RX REV CODE 250 W/ 637 OVERRIDE(OP): Performed by: INTERNAL MEDICINE

## 2025-08-28 PROCEDURE — 36415 COLL VENOUS BLD VENIPUNCTURE: CPT

## 2025-08-28 PROCEDURE — 80048 BASIC METABOLIC PNL TOTAL CA: CPT

## 2025-08-28 PROCEDURE — A9270 NON-COVERED ITEM OR SERVICE: HCPCS | Performed by: STUDENT IN AN ORGANIZED HEALTH CARE EDUCATION/TRAINING PROGRAM

## 2025-08-28 PROCEDURE — A9270 NON-COVERED ITEM OR SERVICE: HCPCS | Performed by: INTERNAL MEDICINE

## 2025-08-28 RX ORDER — SPIRONOLACTONE 100 MG/1
200 TABLET, FILM COATED ORAL
Status: DISCONTINUED | OUTPATIENT
Start: 2025-08-29 | End: 2025-08-29

## 2025-08-28 RX ORDER — SPIRONOLACTONE 100 MG/1
200 TABLET, FILM COATED ORAL
Status: DISCONTINUED | OUTPATIENT
Start: 2025-08-29 | End: 2025-08-28

## 2025-08-28 RX ORDER — FUROSEMIDE 40 MG/1
80 TABLET ORAL
Status: DISCONTINUED | OUTPATIENT
Start: 2025-08-29 | End: 2025-08-29

## 2025-08-28 RX ORDER — FUROSEMIDE 40 MG/1
80 TABLET ORAL
Status: DISCONTINUED | OUTPATIENT
Start: 2025-08-29 | End: 2025-08-28

## 2025-08-28 RX ADMIN — ATORVASTATIN CALCIUM 10 MG: 10 TABLET, FILM COATED ORAL at 05:21

## 2025-08-28 RX ADMIN — POTASSIUM CHLORIDE 40 MEQ: 1500 TABLET, EXTENDED RELEASE ORAL at 16:59

## 2025-08-28 RX ADMIN — TAMSULOSIN HYDROCHLORIDE 0.4 MG: 0.4 CAPSULE ORAL at 08:25

## 2025-08-28 RX ADMIN — Medication 100 MG: at 05:21

## 2025-08-28 RX ADMIN — SUCRALFATE 1 G: 1 TABLET ORAL at 16:59

## 2025-08-28 RX ADMIN — SUCRALFATE 1 G: 1 TABLET ORAL at 05:21

## 2025-08-28 RX ADMIN — SUCRALFATE 1 G: 1 TABLET ORAL at 11:35

## 2025-08-28 RX ADMIN — SUCRALFATE 1 G: 1 TABLET ORAL at 20:18

## 2025-08-28 RX ADMIN — FOLIC ACID 1 MG: 1 TABLET ORAL at 05:20

## 2025-08-28 RX ADMIN — THERA TABS 1 TABLET: TAB at 05:21

## 2025-08-28 RX ADMIN — OMEPRAZOLE 20 MG: 20 CAPSULE, DELAYED RELEASE ORAL at 05:21

## 2025-08-28 RX ADMIN — ESCITALOPRAM OXALATE 10 MG: 10 TABLET ORAL at 05:21

## 2025-08-28 RX ADMIN — POTASSIUM CHLORIDE 40 MEQ: 1500 TABLET, EXTENDED RELEASE ORAL at 05:20

## 2025-08-28 ASSESSMENT — ENCOUNTER SYMPTOMS
CONSTIPATION: 0
NAUSEA: 0
WEAKNESS: 1
ABDOMINAL PAIN: 1
NERVOUS/ANXIOUS: 0
DIARRHEA: 0
TREMORS: 0
HEADACHES: 0
SHORTNESS OF BREATH: 1

## 2025-08-28 ASSESSMENT — PAIN DESCRIPTION - PAIN TYPE
TYPE: ACUTE PAIN
TYPE: ACUTE PAIN

## 2025-08-29 LAB
ANION GAP SERPL CALC-SCNC: 6 MMOL/L (ref 7–16)
ANION GAP SERPL CALC-SCNC: 7 MMOL/L (ref 7–16)
BUN SERPL-MCNC: 8 MG/DL (ref 8–22)
BUN SERPL-MCNC: 8 MG/DL (ref 8–22)
CALCIUM SERPL-MCNC: 7.7 MG/DL (ref 8.5–10.5)
CALCIUM SERPL-MCNC: 8 MG/DL (ref 8.5–10.5)
CHLORIDE SERPL-SCNC: 105 MMOL/L (ref 96–112)
CHLORIDE SERPL-SCNC: 105 MMOL/L (ref 96–112)
CO2 SERPL-SCNC: 19 MMOL/L (ref 20–33)
CO2 SERPL-SCNC: 19 MMOL/L (ref 20–33)
CREAT SERPL-MCNC: 0.57 MG/DL (ref 0.5–1.4)
CREAT SERPL-MCNC: 0.65 MG/DL (ref 0.5–1.4)
ERYTHROCYTE [DISTWIDTH] IN BLOOD BY AUTOMATED COUNT: 56.9 FL (ref 35.9–50)
GFR SERPLBLD CREATININE-BSD FMLA CKD-EPI: 102 ML/MIN/1.73 M 2
GFR SERPLBLD CREATININE-BSD FMLA CKD-EPI: 99 ML/MIN/1.73 M 2
GLUCOSE SERPL-MCNC: 127 MG/DL (ref 65–99)
GLUCOSE SERPL-MCNC: 75 MG/DL (ref 65–99)
HCT VFR BLD AUTO: 28.8 % (ref 37–47)
HGB BLD-MCNC: 9.7 G/DL (ref 12–16)
MAGNESIUM SERPL-MCNC: 1.8 MG/DL (ref 1.5–2.5)
MCH RBC QN AUTO: 34.8 PG (ref 27–33)
MCHC RBC AUTO-ENTMCNC: 33.7 G/DL (ref 32.2–35.5)
MCV RBC AUTO: 103.2 FL (ref 81.4–97.8)
PHOSPHATE SERPL-MCNC: 3.1 MG/DL (ref 2.5–4.5)
PLATELET # BLD AUTO: 102 K/UL (ref 164–446)
PLATELETS.RETICULATED NFR BLD AUTO: 2 % (ref 0.6–13.1)
PMV BLD AUTO: 10 FL (ref 9–12.9)
POTASSIUM SERPL-SCNC: 4 MMOL/L (ref 3.6–5.5)
POTASSIUM SERPL-SCNC: 4.9 MMOL/L (ref 3.6–5.5)
RBC # BLD AUTO: 2.79 M/UL (ref 4.2–5.4)
SODIUM SERPL-SCNC: 130 MMOL/L (ref 135–145)
SODIUM SERPL-SCNC: 131 MMOL/L (ref 135–145)
WBC # BLD AUTO: 2.9 K/UL (ref 4.8–10.8)

## 2025-08-29 PROCEDURE — A9270 NON-COVERED ITEM OR SERVICE: HCPCS

## 2025-08-29 PROCEDURE — 84100 ASSAY OF PHOSPHORUS: CPT

## 2025-08-29 PROCEDURE — 700102 HCHG RX REV CODE 250 W/ 637 OVERRIDE(OP): Performed by: INTERNAL MEDICINE

## 2025-08-29 PROCEDURE — 36415 COLL VENOUS BLD VENIPUNCTURE: CPT

## 2025-08-29 PROCEDURE — 700102 HCHG RX REV CODE 250 W/ 637 OVERRIDE(OP)

## 2025-08-29 PROCEDURE — A9270 NON-COVERED ITEM OR SERVICE: HCPCS | Performed by: INTERNAL MEDICINE

## 2025-08-29 PROCEDURE — 85027 COMPLETE CBC AUTOMATED: CPT

## 2025-08-29 PROCEDURE — A9270 NON-COVERED ITEM OR SERVICE: HCPCS | Performed by: STUDENT IN AN ORGANIZED HEALTH CARE EDUCATION/TRAINING PROGRAM

## 2025-08-29 PROCEDURE — 80048 BASIC METABOLIC PNL TOTAL CA: CPT

## 2025-08-29 PROCEDURE — 700102 HCHG RX REV CODE 250 W/ 637 OVERRIDE(OP): Performed by: STUDENT IN AN ORGANIZED HEALTH CARE EDUCATION/TRAINING PROGRAM

## 2025-08-29 PROCEDURE — 85055 RETICULATED PLATELET ASSAY: CPT

## 2025-08-29 PROCEDURE — 83735 ASSAY OF MAGNESIUM: CPT

## 2025-08-29 PROCEDURE — 770006 HCHG ROOM/CARE - MED/SURG/GYN SEMI*

## 2025-08-29 RX ORDER — MIDODRINE HYDROCHLORIDE 5 MG/1
2.5 TABLET ORAL
Status: DISCONTINUED | OUTPATIENT
Start: 2025-08-29 | End: 2025-08-31 | Stop reason: HOSPADM

## 2025-08-29 RX ORDER — FUROSEMIDE 40 MG/1
120 TABLET ORAL
Status: DISCONTINUED | OUTPATIENT
Start: 2025-08-30 | End: 2025-08-31 | Stop reason: HOSPADM

## 2025-08-29 RX ORDER — SPIRONOLACTONE 100 MG/1
300 TABLET, FILM COATED ORAL
Status: DISCONTINUED | OUTPATIENT
Start: 2025-08-30 | End: 2025-08-31 | Stop reason: HOSPADM

## 2025-08-29 RX ADMIN — THERA TABS 1 TABLET: TAB at 05:09

## 2025-08-29 RX ADMIN — POTASSIUM CHLORIDE 40 MEQ: 1500 TABLET, EXTENDED RELEASE ORAL at 05:09

## 2025-08-29 RX ADMIN — SUCRALFATE 1 G: 1 TABLET ORAL at 17:33

## 2025-08-29 RX ADMIN — MIDODRINE HYDROCHLORIDE 2.5 MG: 5 TABLET ORAL at 08:36

## 2025-08-29 RX ADMIN — ESCITALOPRAM OXALATE 10 MG: 10 TABLET ORAL at 05:10

## 2025-08-29 RX ADMIN — Medication 100 MG: at 05:09

## 2025-08-29 RX ADMIN — ATORVASTATIN CALCIUM 10 MG: 10 TABLET, FILM COATED ORAL at 05:09

## 2025-08-29 RX ADMIN — MIDODRINE HYDROCHLORIDE 2.5 MG: 5 TABLET ORAL at 17:33

## 2025-08-29 RX ADMIN — FOLIC ACID 1 MG: 1 TABLET ORAL at 05:09

## 2025-08-29 RX ADMIN — SUCRALFATE 1 G: 1 TABLET ORAL at 21:45

## 2025-08-29 RX ADMIN — SENNOSIDES, DOCUSATE SODIUM 2 TABLET: 50; 8.6 TABLET, FILM COATED ORAL at 17:33

## 2025-08-29 RX ADMIN — SUCRALFATE 1 G: 1 TABLET ORAL at 12:08

## 2025-08-29 RX ADMIN — TAMSULOSIN HYDROCHLORIDE 0.4 MG: 0.4 CAPSULE ORAL at 08:36

## 2025-08-29 RX ADMIN — SUCRALFATE 1 G: 1 TABLET ORAL at 05:53

## 2025-08-29 RX ADMIN — SPIRONOLACTONE 200 MG: 100 TABLET ORAL at 08:36

## 2025-08-29 RX ADMIN — FUROSEMIDE 80 MG: 40 TABLET ORAL at 08:36

## 2025-08-29 RX ADMIN — OMEPRAZOLE 20 MG: 20 CAPSULE, DELAYED RELEASE ORAL at 05:09

## 2025-08-29 RX ADMIN — MIDODRINE HYDROCHLORIDE 2.5 MG: 5 TABLET ORAL at 12:08

## 2025-08-29 ASSESSMENT — ENCOUNTER SYMPTOMS
NAUSEA: 0
CONSTIPATION: 0
SHORTNESS OF BREATH: 1
ABDOMINAL PAIN: 1
DIARRHEA: 0
NERVOUS/ANXIOUS: 0
TREMORS: 0
HEADACHES: 0
WEAKNESS: 1

## 2025-08-30 LAB
ANION GAP SERPL CALC-SCNC: 8 MMOL/L (ref 7–16)
BUN SERPL-MCNC: 9 MG/DL (ref 8–22)
CALCIUM SERPL-MCNC: 7.8 MG/DL (ref 8.5–10.5)
CHLORIDE SERPL-SCNC: 104 MMOL/L (ref 96–112)
CO2 SERPL-SCNC: 19 MMOL/L (ref 20–33)
CREAT SERPL-MCNC: 0.6 MG/DL (ref 0.5–1.4)
ERYTHROCYTE [DISTWIDTH] IN BLOOD BY AUTOMATED COUNT: 57.3 FL (ref 35.9–50)
GFR SERPLBLD CREATININE-BSD FMLA CKD-EPI: 101 ML/MIN/1.73 M 2
GLUCOSE SERPL-MCNC: 87 MG/DL (ref 65–99)
HCT VFR BLD AUTO: 29.1 % (ref 37–47)
HGB BLD-MCNC: 9.7 G/DL (ref 12–16)
MAGNESIUM SERPL-MCNC: 1.5 MG/DL (ref 1.5–2.5)
MCH RBC QN AUTO: 35 PG (ref 27–33)
MCHC RBC AUTO-ENTMCNC: 33.3 G/DL (ref 32.2–35.5)
MCV RBC AUTO: 105.1 FL (ref 81.4–97.8)
PHOSPHATE SERPL-MCNC: 3.7 MG/DL (ref 2.5–4.5)
PLATELET # BLD AUTO: 104 K/UL (ref 164–446)
PMV BLD AUTO: 10.1 FL (ref 9–12.9)
POTASSIUM SERPL-SCNC: 4 MMOL/L (ref 3.6–5.5)
RBC # BLD AUTO: 2.77 M/UL (ref 4.2–5.4)
SODIUM SERPL-SCNC: 131 MMOL/L (ref 135–145)
WBC # BLD AUTO: 2.9 K/UL (ref 4.8–10.8)

## 2025-08-30 PROCEDURE — 85027 COMPLETE CBC AUTOMATED: CPT

## 2025-08-30 PROCEDURE — 80048 BASIC METABOLIC PNL TOTAL CA: CPT

## 2025-08-30 PROCEDURE — A9270 NON-COVERED ITEM OR SERVICE: HCPCS

## 2025-08-30 PROCEDURE — A9270 NON-COVERED ITEM OR SERVICE: HCPCS | Performed by: INTERNAL MEDICINE

## 2025-08-30 PROCEDURE — 84100 ASSAY OF PHOSPHORUS: CPT

## 2025-08-30 PROCEDURE — 700102 HCHG RX REV CODE 250 W/ 637 OVERRIDE(OP)

## 2025-08-30 PROCEDURE — 36415 COLL VENOUS BLD VENIPUNCTURE: CPT

## 2025-08-30 PROCEDURE — 770006 HCHG ROOM/CARE - MED/SURG/GYN SEMI*

## 2025-08-30 PROCEDURE — 700102 HCHG RX REV CODE 250 W/ 637 OVERRIDE(OP): Performed by: INTERNAL MEDICINE

## 2025-08-30 PROCEDURE — A9270 NON-COVERED ITEM OR SERVICE: HCPCS | Performed by: STUDENT IN AN ORGANIZED HEALTH CARE EDUCATION/TRAINING PROGRAM

## 2025-08-30 PROCEDURE — 83735 ASSAY OF MAGNESIUM: CPT

## 2025-08-30 PROCEDURE — 700102 HCHG RX REV CODE 250 W/ 637 OVERRIDE(OP): Performed by: STUDENT IN AN ORGANIZED HEALTH CARE EDUCATION/TRAINING PROGRAM

## 2025-08-30 RX ORDER — LANOLIN ALCOHOL/MO/W.PET/CERES
800 CREAM (GRAM) TOPICAL ONCE
Status: COMPLETED | OUTPATIENT
Start: 2025-08-30 | End: 2025-08-30

## 2025-08-30 RX ADMIN — ATORVASTATIN CALCIUM 10 MG: 10 TABLET, FILM COATED ORAL at 04:50

## 2025-08-30 RX ADMIN — TAMSULOSIN HYDROCHLORIDE 0.4 MG: 0.4 CAPSULE ORAL at 08:51

## 2025-08-30 RX ADMIN — ESCITALOPRAM OXALATE 10 MG: 10 TABLET ORAL at 04:48

## 2025-08-30 RX ADMIN — FOLIC ACID 1 MG: 1 TABLET ORAL at 04:50

## 2025-08-30 RX ADMIN — SPIRONOLACTONE 300 MG: 100 TABLET ORAL at 08:50

## 2025-08-30 RX ADMIN — Medication 100 MG: at 04:49

## 2025-08-30 RX ADMIN — MIDODRINE HYDROCHLORIDE 2.5 MG: 5 TABLET ORAL at 12:23

## 2025-08-30 RX ADMIN — FUROSEMIDE 120 MG: 40 TABLET ORAL at 08:50

## 2025-08-30 RX ADMIN — Medication 800 MG: at 08:50

## 2025-08-30 RX ADMIN — SENNOSIDES, DOCUSATE SODIUM 2 TABLET: 50; 8.6 TABLET, FILM COATED ORAL at 17:42

## 2025-08-30 RX ADMIN — THERA TABS 1 TABLET: TAB at 04:49

## 2025-08-30 RX ADMIN — SUCRALFATE 1 G: 1 TABLET ORAL at 05:00

## 2025-08-30 RX ADMIN — OMEPRAZOLE 20 MG: 20 CAPSULE, DELAYED RELEASE ORAL at 04:50

## 2025-08-30 RX ADMIN — SUCRALFATE 1 G: 1 TABLET ORAL at 12:23

## 2025-08-30 RX ADMIN — SUCRALFATE 1 G: 1 TABLET ORAL at 17:42

## 2025-08-30 RX ADMIN — SUCRALFATE 1 G: 1 TABLET ORAL at 20:54

## 2025-08-30 RX ADMIN — MIDODRINE HYDROCHLORIDE 2.5 MG: 5 TABLET ORAL at 17:42

## 2025-08-30 ASSESSMENT — ENCOUNTER SYMPTOMS
DIARRHEA: 0
TREMORS: 0
SHORTNESS OF BREATH: 1
HEADACHES: 0
ABDOMINAL PAIN: 1
NAUSEA: 0
NERVOUS/ANXIOUS: 0
CONSTIPATION: 0
WEAKNESS: 1

## 2025-08-30 ASSESSMENT — PAIN DESCRIPTION - PAIN TYPE: TYPE: ACUTE PAIN

## 2025-08-31 ENCOUNTER — PHARMACY VISIT (OUTPATIENT)
Dept: PHARMACY | Facility: MEDICAL CENTER | Age: 65
End: 2025-08-31
Payer: COMMERCIAL

## 2025-08-31 VITALS
RESPIRATION RATE: 18 BRPM | TEMPERATURE: 98.1 F | BODY MASS INDEX: 36.55 KG/M2 | HEART RATE: 76 BPM | OXYGEN SATURATION: 94 % | DIASTOLIC BLOOD PRESSURE: 72 MMHG | SYSTOLIC BLOOD PRESSURE: 114 MMHG | WEIGHT: 198.63 LBS | HEIGHT: 62 IN

## 2025-08-31 PROBLEM — R19.5 FECAL OCCULT BLOOD TEST POSITIVE: Status: RESOLVED | Noted: 2025-08-21 | Resolved: 2025-08-31

## 2025-08-31 PROBLEM — N30.01 ACUTE CYSTITIS WITH HEMATURIA: Status: RESOLVED | Noted: 2025-08-21 | Resolved: 2025-08-31

## 2025-08-31 PROBLEM — E83.39 HYPOPHOSPHATEMIA: Status: RESOLVED | Noted: 2025-08-27 | Resolved: 2025-08-31

## 2025-08-31 LAB
ANION GAP SERPL CALC-SCNC: 8 MMOL/L (ref 7–16)
BUN SERPL-MCNC: 9 MG/DL (ref 8–22)
CALCIUM SERPL-MCNC: 8.1 MG/DL (ref 8.5–10.5)
CHLORIDE SERPL-SCNC: 101 MMOL/L (ref 96–112)
CO2 SERPL-SCNC: 22 MMOL/L (ref 20–33)
CREAT SERPL-MCNC: 0.72 MG/DL (ref 0.5–1.4)
ERYTHROCYTE [DISTWIDTH] IN BLOOD BY AUTOMATED COUNT: 56.1 FL (ref 35.9–50)
GFR SERPLBLD CREATININE-BSD FMLA CKD-EPI: 94 ML/MIN/1.73 M 2
GLUCOSE SERPL-MCNC: 111 MG/DL (ref 65–99)
HCT VFR BLD AUTO: 30.2 % (ref 37–47)
HGB BLD-MCNC: 10.2 G/DL (ref 12–16)
MAGNESIUM SERPL-MCNC: 1.5 MG/DL (ref 1.5–2.5)
MCH RBC QN AUTO: 34.9 PG (ref 27–33)
MCHC RBC AUTO-ENTMCNC: 33.8 G/DL (ref 32.2–35.5)
MCV RBC AUTO: 103.4 FL (ref 81.4–97.8)
PHOSPHATE SERPL-MCNC: 3.9 MG/DL (ref 2.5–4.5)
PLATELET # BLD AUTO: 116 K/UL (ref 164–446)
PMV BLD AUTO: 10.4 FL (ref 9–12.9)
POTASSIUM SERPL-SCNC: 3.5 MMOL/L (ref 3.6–5.5)
RBC # BLD AUTO: 2.92 M/UL (ref 4.2–5.4)
SODIUM SERPL-SCNC: 131 MMOL/L (ref 135–145)
WBC # BLD AUTO: 3 K/UL (ref 4.8–10.8)

## 2025-08-31 PROCEDURE — A9270 NON-COVERED ITEM OR SERVICE: HCPCS

## 2025-08-31 PROCEDURE — 700102 HCHG RX REV CODE 250 W/ 637 OVERRIDE(OP)

## 2025-08-31 PROCEDURE — A9270 NON-COVERED ITEM OR SERVICE: HCPCS | Performed by: INTERNAL MEDICINE

## 2025-08-31 PROCEDURE — 700102 HCHG RX REV CODE 250 W/ 637 OVERRIDE(OP): Performed by: INTERNAL MEDICINE

## 2025-08-31 PROCEDURE — RXMED WILLOW AMBULATORY MEDICATION CHARGE

## 2025-08-31 PROCEDURE — A9270 NON-COVERED ITEM OR SERVICE: HCPCS | Performed by: STUDENT IN AN ORGANIZED HEALTH CARE EDUCATION/TRAINING PROGRAM

## 2025-08-31 PROCEDURE — 700102 HCHG RX REV CODE 250 W/ 637 OVERRIDE(OP): Performed by: STUDENT IN AN ORGANIZED HEALTH CARE EDUCATION/TRAINING PROGRAM

## 2025-08-31 RX ORDER — MIDODRINE HYDROCHLORIDE 2.5 MG/1
2.5 TABLET ORAL
Qty: 60 TABLET | Refills: 3 | Status: SHIPPED | OUTPATIENT
Start: 2025-08-31

## 2025-08-31 RX ORDER — LACTULOSE 10 G/15ML
20 SOLUTION ORAL 2 TIMES DAILY
Qty: 473 ML | Refills: 3 | Status: SHIPPED | OUTPATIENT
Start: 2025-08-31

## 2025-08-31 RX ORDER — TAMSULOSIN HYDROCHLORIDE 0.4 MG/1
0.4 CAPSULE ORAL
Qty: 30 CAPSULE | Refills: 3 | Status: SHIPPED | OUTPATIENT
Start: 2025-09-01

## 2025-08-31 RX ORDER — FUROSEMIDE 40 MG/1
120 TABLET ORAL DAILY
Qty: 90 TABLET | Refills: 3 | Status: SHIPPED | OUTPATIENT
Start: 2025-09-01

## 2025-08-31 RX ORDER — SPIRONOLACTONE 100 MG/1
300 TABLET, FILM COATED ORAL DAILY
Qty: 90 TABLET | Refills: 3 | Status: SHIPPED | OUTPATIENT
Start: 2025-09-01

## 2025-08-31 RX ORDER — POTASSIUM CHLORIDE 1500 MG/1
40 TABLET, EXTENDED RELEASE ORAL ONCE
Status: COMPLETED | OUTPATIENT
Start: 2025-08-31 | End: 2025-08-31

## 2025-08-31 RX ORDER — LANOLIN ALCOHOL/MO/W.PET/CERES
800 CREAM (GRAM) TOPICAL ONCE
Status: COMPLETED | OUTPATIENT
Start: 2025-08-31 | End: 2025-08-31

## 2025-08-31 RX ORDER — LANOLIN ALCOHOL/MO/W.PET/CERES
400 CREAM (GRAM) TOPICAL DAILY
Qty: 30 TABLET | Refills: 3 | Status: SHIPPED | OUTPATIENT
Start: 2025-08-31

## 2025-08-31 RX ADMIN — MIDODRINE HYDROCHLORIDE 2.5 MG: 5 TABLET ORAL at 13:12

## 2025-08-31 RX ADMIN — SUCRALFATE 1 G: 1 TABLET ORAL at 13:12

## 2025-08-31 RX ADMIN — POTASSIUM CHLORIDE 40 MEQ: 1500 TABLET, EXTENDED RELEASE ORAL at 10:08

## 2025-08-31 RX ADMIN — MIDODRINE HYDROCHLORIDE 2.5 MG: 5 TABLET ORAL at 08:34

## 2025-08-31 RX ADMIN — Medication 800 MG: at 10:08

## 2025-08-31 RX ADMIN — SUCRALFATE 1 G: 1 TABLET ORAL at 06:21

## 2025-08-31 RX ADMIN — FOLIC ACID 1 MG: 1 TABLET ORAL at 06:04

## 2025-08-31 RX ADMIN — ATORVASTATIN CALCIUM 10 MG: 10 TABLET, FILM COATED ORAL at 06:07

## 2025-08-31 RX ADMIN — THERA TABS 1 TABLET: TAB at 06:04

## 2025-08-31 RX ADMIN — Medication 100 MG: at 06:07

## 2025-08-31 RX ADMIN — SPIRONOLACTONE 300 MG: 100 TABLET ORAL at 06:06

## 2025-08-31 RX ADMIN — OMEPRAZOLE 20 MG: 20 CAPSULE, DELAYED RELEASE ORAL at 06:04

## 2025-08-31 RX ADMIN — TAMSULOSIN HYDROCHLORIDE 0.4 MG: 0.4 CAPSULE ORAL at 08:34

## 2025-08-31 RX ADMIN — FUROSEMIDE 120 MG: 40 TABLET ORAL at 06:05

## 2025-08-31 RX ADMIN — ESCITALOPRAM OXALATE 10 MG: 10 TABLET ORAL at 06:08

## (undated) DEVICE — ELECTRODE DUAL RETURN W/ CORD - (50/PK)

## (undated) DEVICE — ELECTRODE 850 FOAM ADHESIVE - HYDROGEL RADIOTRNSPRNT (50/PK)

## (undated) DEVICE — DRAPE LARGE 3 QUARTER - (20/CA)

## (undated) DEVICE — SLEEVE, VASO, THIGH, MED

## (undated) DEVICE — SUTURE 0 VICRYL PLUS CT-1 - 8 X 18 INCH (12/BX)

## (undated) DEVICE — KIT ANESTHESIA W/CIRCUIT & 3/LT BAG W/FILTER (20EA/CA)

## (undated) DEVICE — GLOVE BIOGEL INDICATOR SZ 6.5 SURGICAL PF LTX - (50PR/BX 4BX/CA)

## (undated) DEVICE — GLOVE SZ 7 BIOGEL PI MICRO - PF LF (50PR/BX 4BX/CA)

## (undated) DEVICE — HEAD HOLDER JUNIOR/ADULT

## (undated) DEVICE — DRAPE STRLE REG TOWEL 18X24 - (10/BX 4BX/CA)"

## (undated) DEVICE — DRAPE 36X28IN RAD CARM BND BG - (25/CA) O

## (undated) DEVICE — SUCTION INSTRUMENT YANKAUER BULBOUS TIP W/O VENT (50EA/CA)

## (undated) DEVICE — GLOVE BIOGEL INDICATOR SZ 8 SURGICAL PF LTX - (50/BX 4BX/CA)

## (undated) DEVICE — PAD LAP STERILE 18 X 18 - (5/PK 40PK/CA)

## (undated) DEVICE — SET LEADWIRE 5 LEAD BEDSIDE DISPOSABLE ECG (1SET OF 5/EA)

## (undated) DEVICE — GLOVE BIOGEL SZ 7.5 SURGICAL PF LTX - (50PR/BX 4BX/CA)

## (undated) DEVICE — NEPTUNE 4 PORT MANIFOLD - (20/PK)

## (undated) DEVICE — MASK ANESTHESIA ADULT  - (100/CA)

## (undated) DEVICE — SET EXTENSION WITH 2 PORTS (48EA/CA) ***PART #2C8610 IS A SUBSTITUTE*****

## (undated) DEVICE — CANISTER SUCTION 3000ML MECHANICAL FILTER AUTO SHUTOFF MEDI-VAC NONSTERILE LF DISP  (40EA/CA)

## (undated) DEVICE — CHLORAPREP 26 ML APPLICATOR - ORANGE TINT(25/CA)

## (undated) DEVICE — TUBING CLEARLINK DUO-VENT - C-FLO (48EA/CA)

## (undated) DEVICE — SUTURE GENERAL

## (undated) DEVICE — BANDAGE ELASTIC STERILE VELCRO 6 X 5 YDS (25EA/CA)

## (undated) DEVICE — LACTATED RINGERS INJ 1000 ML - (14EA/CA 60CA/PF)

## (undated) DEVICE — TOWEL STOP TIMEOUT SAFETY FLAG (40EA/CA)

## (undated) DEVICE — GOWN WARMING STANDARD FLEX - (30/CA)

## (undated) DEVICE — SENSOR SPO2 NEO LNCS ADHESIVE (20/BX) SEE USER NOTES

## (undated) DEVICE — PROTECTOR ULNA NERVE - (36PR/CA)

## (undated) DEVICE — SODIUM CHL IRRIGATION 0.9% 1000ML (12EA/CA)

## (undated) DEVICE — SUTURE 2-0 VICRYL PLUS CT-1 - 8 X 18 INCH(12/BX)

## (undated) DEVICE — PADDING CAST 6 IN STERILE - 6 X 4 YDS (24/CA)